# Patient Record
Sex: MALE | Race: WHITE | NOT HISPANIC OR LATINO | Employment: FULL TIME | URBAN - METROPOLITAN AREA
[De-identification: names, ages, dates, MRNs, and addresses within clinical notes are randomized per-mention and may not be internally consistent; named-entity substitution may affect disease eponyms.]

---

## 2019-02-27 ENCOUNTER — HOSPITAL ENCOUNTER (EMERGENCY)
Facility: HOSPITAL | Age: 39
Discharge: HOME | End: 2019-02-27
Attending: EMERGENCY MEDICINE | Admitting: EMERGENCY MEDICINE

## 2019-02-27 VITALS
SYSTOLIC BLOOD PRESSURE: 168 MMHG | HEART RATE: 100 BPM | DIASTOLIC BLOOD PRESSURE: 111 MMHG | HEIGHT: 74 IN | RESPIRATION RATE: 18 BRPM | TEMPERATURE: 98 F | BODY MASS INDEX: 26.95 KG/M2 | WEIGHT: 210 LBS | OXYGEN SATURATION: 97 %

## 2019-02-27 DIAGNOSIS — K04.7 DENTAL INFECTION: Primary | ICD-10-CM

## 2019-02-27 DIAGNOSIS — K08.89 PAIN, DENTAL: ICD-10-CM

## 2019-02-27 PROCEDURE — 99283 EMERGENCY DEPT VISIT LOW MDM: CPT

## 2019-02-27 RX ORDER — AMOXICILLIN 500 MG/1
500 CAPSULE ORAL 3 TIMES DAILY
Qty: 30 CAPSULE | Refills: 0 | Status: SHIPPED | OUTPATIENT
Start: 2019-02-27 | End: 2019-06-01

## 2019-02-27 RX ORDER — ETODOLAC 200 MG/1
200 CAPSULE ORAL EVERY 8 HOURS
Qty: 12 CAPSULE | Refills: 0 | Status: SHIPPED | OUTPATIENT
Start: 2019-02-27 | End: 2019-06-01

## 2019-02-27 RX ADMIN — LIDOCAINE HYDROCHLORIDE 5 ML: 20 SOLUTION ORAL; TOPICAL at 08:37

## 2019-06-01 ENCOUNTER — HOSPITAL ENCOUNTER (INPATIENT)
Facility: HOSPITAL | Age: 39
LOS: 2 days | Discharge: HOME | End: 2019-06-03
Attending: EMERGENCY MEDICINE | Admitting: INTERNAL MEDICINE

## 2019-06-01 ENCOUNTER — APPOINTMENT (OUTPATIENT)
Dept: CT IMAGING | Facility: HOSPITAL | Age: 39
End: 2019-06-01

## 2019-06-01 DIAGNOSIS — J03.90 TONSILLITIS: Primary | ICD-10-CM

## 2019-06-01 DIAGNOSIS — J36 PERITONSILLAR ABSCESS: ICD-10-CM

## 2019-06-01 PROBLEM — Z72.0 TOBACCO ABUSE: Status: ACTIVE | Noted: 2019-06-01

## 2019-06-01 PROBLEM — A41.9 SEPSIS, UNSPECIFIED ORGANISM (HCC): Status: ACTIVE | Noted: 2019-06-01

## 2019-06-01 PROBLEM — J02.9 ACUTE SORE THROAT: Status: ACTIVE | Noted: 2019-06-01

## 2019-06-01 LAB
ALBUMIN SERPL-MCNC: 4.2 G/DL (ref 3.5–5.2)
ALBUMIN/GLOB SERPL: 1.2 G/DL
ALP SERPL-CCNC: 87 U/L (ref 39–117)
ALT SERPL W P-5'-P-CCNC: 11 U/L (ref 1–41)
ANION GAP SERPL CALCULATED.3IONS-SCNC: 12 MMOL/L
ANION GAP SERPL CALCULATED.3IONS-SCNC: 13 MMOL/L
AST SERPL-CCNC: 17 U/L (ref 1–40)
BASOPHILS # BLD AUTO: 0.02 10*3/MM3 (ref 0–0.2)
BASOPHILS NFR BLD AUTO: 0.1 % (ref 0–1.5)
BILIRUB SERPL-MCNC: 0.9 MG/DL (ref 0.2–1.2)
BUN BLD-MCNC: 10 MG/DL (ref 6–20)
BUN BLD-MCNC: 12 MG/DL (ref 6–20)
BUN/CREAT SERPL: 11.8 (ref 7–25)
BUN/CREAT SERPL: 12.6 (ref 7–25)
CALCIUM SPEC-SCNC: 8.8 MG/DL (ref 8.6–10.5)
CALCIUM SPEC-SCNC: 9.3 MG/DL (ref 8.6–10.5)
CHLORIDE SERPL-SCNC: 103 MMOL/L (ref 98–107)
CHLORIDE SERPL-SCNC: 103 MMOL/L (ref 98–107)
CO2 SERPL-SCNC: 21 MMOL/L (ref 22–29)
CO2 SERPL-SCNC: 26 MMOL/L (ref 22–29)
CREAT BLD-MCNC: 0.85 MG/DL (ref 0.76–1.27)
CREAT BLD-MCNC: 0.95 MG/DL (ref 0.76–1.27)
D-LACTATE SERPL-SCNC: 0.8 MMOL/L (ref 0.5–2)
DEPRECATED RDW RBC AUTO: 45.4 FL (ref 37–54)
DEPRECATED RDW RBC AUTO: 46.8 FL (ref 37–54)
EOSINOPHIL # BLD AUTO: 0.16 10*3/MM3 (ref 0–0.4)
EOSINOPHIL NFR BLD AUTO: 1.2 % (ref 0.3–6.2)
ERYTHROCYTE [DISTWIDTH] IN BLOOD BY AUTOMATED COUNT: 13.7 % (ref 12.3–15.4)
ERYTHROCYTE [DISTWIDTH] IN BLOOD BY AUTOMATED COUNT: 13.8 % (ref 12.3–15.4)
GFR SERPL CREATININE-BSD FRML MDRD: 100 ML/MIN/1.73
GFR SERPL CREATININE-BSD FRML MDRD: 88 ML/MIN/1.73
GLOBULIN UR ELPH-MCNC: 3.4 GM/DL
GLUCOSE BLD-MCNC: 118 MG/DL (ref 65–99)
GLUCOSE BLD-MCNC: 161 MG/DL (ref 65–99)
HCT VFR BLD AUTO: 38.9 % (ref 37.5–51)
HCT VFR BLD AUTO: 40.2 % (ref 37.5–51)
HETEROPH AB SER QL LA: NEGATIVE
HGB BLD-MCNC: 12.7 G/DL (ref 13–17.7)
HGB BLD-MCNC: 12.7 G/DL (ref 13–17.7)
IMM GRANULOCYTES # BLD AUTO: 0.03 10*3/MM3 (ref 0–0.05)
IMM GRANULOCYTES NFR BLD AUTO: 0.2 % (ref 0–0.5)
LYMPHOCYTES # BLD AUTO: 1.97 10*3/MM3 (ref 0.7–3.1)
LYMPHOCYTES NFR BLD AUTO: 14.2 % (ref 19.6–45.3)
MCH RBC QN AUTO: 29.2 PG (ref 26.6–33)
MCH RBC QN AUTO: 29.4 PG (ref 26.6–33)
MCHC RBC AUTO-ENTMCNC: 31.6 G/DL (ref 31.5–35.7)
MCHC RBC AUTO-ENTMCNC: 32.6 G/DL (ref 31.5–35.7)
MCV RBC AUTO: 90 FL (ref 79–97)
MCV RBC AUTO: 92.4 FL (ref 79–97)
MONOCYTES # BLD AUTO: 1.25 10*3/MM3 (ref 0.1–0.9)
MONOCYTES NFR BLD AUTO: 9 % (ref 5–12)
NEUTROPHILS # BLD AUTO: 10.41 10*3/MM3 (ref 1.7–7)
NEUTROPHILS NFR BLD AUTO: 75.3 % (ref 42.7–76)
PLATELET # BLD AUTO: 254 10*3/MM3 (ref 140–450)
PLATELET # BLD AUTO: 256 10*3/MM3 (ref 140–450)
PMV BLD AUTO: 10.1 FL (ref 6–12)
PMV BLD AUTO: 10.6 FL (ref 6–12)
POTASSIUM BLD-SCNC: 4.1 MMOL/L (ref 3.5–5.2)
POTASSIUM BLD-SCNC: 4.3 MMOL/L (ref 3.5–5.2)
PROT SERPL-MCNC: 7.6 G/DL (ref 6–8.5)
RBC # BLD AUTO: 4.32 10*6/MM3 (ref 4.14–5.8)
RBC # BLD AUTO: 4.35 10*6/MM3 (ref 4.14–5.8)
S PYO AG THROAT QL: NEGATIVE
SODIUM BLD-SCNC: 137 MMOL/L (ref 136–145)
SODIUM BLD-SCNC: 141 MMOL/L (ref 136–145)
WBC NRBC COR # BLD: 13.84 10*3/MM3 (ref 3.4–10.8)
WBC NRBC COR # BLD: 13.95 10*3/MM3 (ref 3.4–10.8)

## 2019-06-01 PROCEDURE — 99285 EMERGENCY DEPT VISIT HI MDM: CPT

## 2019-06-01 PROCEDURE — 83605 ASSAY OF LACTIC ACID: CPT | Performed by: PHYSICIAN ASSISTANT

## 2019-06-01 PROCEDURE — 87081 CULTURE SCREEN ONLY: CPT | Performed by: FAMILY MEDICINE

## 2019-06-01 PROCEDURE — 80053 COMPREHEN METABOLIC PANEL: CPT | Performed by: EMERGENCY MEDICINE

## 2019-06-01 PROCEDURE — 25010000002 IOPAMIDOL 61 % SOLUTION: Performed by: EMERGENCY MEDICINE

## 2019-06-01 PROCEDURE — 70491 CT SOFT TISSUE NECK W/DYE: CPT

## 2019-06-01 PROCEDURE — 25010000002 PIPERACILLIN SOD-TAZOBACTAM PER 1 G: Performed by: INTERNAL MEDICINE

## 2019-06-01 PROCEDURE — 85025 COMPLETE CBC W/AUTO DIFF WBC: CPT | Performed by: EMERGENCY MEDICINE

## 2019-06-01 PROCEDURE — 25010000002 DEXAMETHASONE PER 1 MG: Performed by: FAMILY MEDICINE

## 2019-06-01 PROCEDURE — 87880 STREP A ASSAY W/OPTIC: CPT | Performed by: FAMILY MEDICINE

## 2019-06-01 PROCEDURE — 86308 HETEROPHILE ANTIBODY SCREEN: CPT | Performed by: OTOLARYNGOLOGY

## 2019-06-01 PROCEDURE — 25010000002 FENTANYL CITRATE (PF) 100 MCG/2ML SOLUTION: Performed by: EMERGENCY MEDICINE

## 2019-06-01 PROCEDURE — 87040 BLOOD CULTURE FOR BACTERIA: CPT | Performed by: PHYSICIAN ASSISTANT

## 2019-06-01 PROCEDURE — 99223 1ST HOSP IP/OBS HIGH 75: CPT | Performed by: FAMILY MEDICINE

## 2019-06-01 PROCEDURE — 99406 BEHAV CHNG SMOKING 3-10 MIN: CPT | Performed by: FAMILY MEDICINE

## 2019-06-01 PROCEDURE — 25010000002 PIPERACILLIN SOD-TAZOBACTAM PER 1 G: Performed by: EMERGENCY MEDICINE

## 2019-06-01 PROCEDURE — 85027 COMPLETE CBC AUTOMATED: CPT | Performed by: PHYSICIAN ASSISTANT

## 2019-06-01 PROCEDURE — 25010000002 DEXAMETHASONE PER 1 MG: Performed by: EMERGENCY MEDICINE

## 2019-06-01 RX ORDER — SODIUM CHLORIDE 0.9 % (FLUSH) 0.9 %
10 SYRINGE (ML) INJECTION AS NEEDED
Status: DISCONTINUED | OUTPATIENT
Start: 2019-06-01 | End: 2019-06-03 | Stop reason: HOSPADM

## 2019-06-01 RX ORDER — ACETAMINOPHEN 325 MG/1
650 TABLET ORAL EVERY 4 HOURS PRN
Status: DISCONTINUED | OUTPATIENT
Start: 2019-06-01 | End: 2019-06-03 | Stop reason: HOSPADM

## 2019-06-01 RX ORDER — DEXAMETHASONE IN 0.9 % SOD CHL 10 MG/50ML
10 INTRAVENOUS SOLUTION, PIGGYBACK (ML) INTRAVENOUS ONCE
Status: COMPLETED | OUTPATIENT
Start: 2019-06-01 | End: 2019-06-01

## 2019-06-01 RX ORDER — ONDANSETRON 2 MG/ML
4 INJECTION INTRAMUSCULAR; INTRAVENOUS EVERY 6 HOURS PRN
Status: DISCONTINUED | OUTPATIENT
Start: 2019-06-01 | End: 2019-06-03 | Stop reason: HOSPADM

## 2019-06-01 RX ORDER — SODIUM CHLORIDE 9 MG/ML
100 INJECTION, SOLUTION INTRAVENOUS CONTINUOUS
Status: DISCONTINUED | OUTPATIENT
Start: 2019-06-01 | End: 2019-06-01

## 2019-06-01 RX ORDER — SODIUM CHLORIDE 0.9 % (FLUSH) 0.9 %
3 SYRINGE (ML) INJECTION EVERY 12 HOURS SCHEDULED
Status: DISCONTINUED | OUTPATIENT
Start: 2019-06-01 | End: 2019-06-03 | Stop reason: HOSPADM

## 2019-06-01 RX ORDER — DEXAMETHASONE SODIUM PHOSPHATE 4 MG/ML
4 INJECTION, SOLUTION INTRA-ARTICULAR; INTRALESIONAL; INTRAMUSCULAR; INTRAVENOUS; SOFT TISSUE EVERY 8 HOURS
Status: DISCONTINUED | OUTPATIENT
Start: 2019-06-01 | End: 2019-06-03 | Stop reason: HOSPADM

## 2019-06-01 RX ORDER — SODIUM CHLORIDE 0.9 % (FLUSH) 0.9 %
3-10 SYRINGE (ML) INJECTION AS NEEDED
Status: DISCONTINUED | OUTPATIENT
Start: 2019-06-01 | End: 2019-06-03 | Stop reason: HOSPADM

## 2019-06-01 RX ORDER — CHOLECALCIFEROL (VITAMIN D3) 125 MCG
5 CAPSULE ORAL NIGHTLY PRN
Status: DISCONTINUED | OUTPATIENT
Start: 2019-06-01 | End: 2019-06-03 | Stop reason: HOSPADM

## 2019-06-01 RX ORDER — FENTANYL CITRATE 50 UG/ML
50 INJECTION, SOLUTION INTRAMUSCULAR; INTRAVENOUS ONCE
Status: COMPLETED | OUTPATIENT
Start: 2019-06-01 | End: 2019-06-01

## 2019-06-01 RX ORDER — TRAMADOL HYDROCHLORIDE 50 MG/1
75 TABLET ORAL EVERY 6 HOURS PRN
Status: DISCONTINUED | OUTPATIENT
Start: 2019-06-01 | End: 2019-06-03 | Stop reason: HOSPADM

## 2019-06-01 RX ADMIN — SODIUM CHLORIDE, PRESERVATIVE FREE 3 ML: 5 INJECTION INTRAVENOUS at 22:07

## 2019-06-01 RX ADMIN — TAZOBACTAM SODIUM AND PIPERACILLIN SODIUM 3.38 G: 375; 3 INJECTION, SOLUTION INTRAVENOUS at 22:06

## 2019-06-01 RX ADMIN — SODIUM CHLORIDE 1000 ML: 9 INJECTION, SOLUTION INTRAVENOUS at 01:51

## 2019-06-01 RX ADMIN — SODIUM CHLORIDE 100 ML/HR: 9 INJECTION, SOLUTION INTRAVENOUS at 05:13

## 2019-06-01 RX ADMIN — TRAMADOL HYDROCHLORIDE 75 MG: 50 TABLET, FILM COATED ORAL at 22:06

## 2019-06-01 RX ADMIN — DEXAMETHASONE SODIUM PHOSPHATE 4 MG: 4 INJECTION, SOLUTION INTRA-ARTICULAR; INTRALESIONAL; INTRAMUSCULAR; INTRAVENOUS; SOFT TISSUE at 10:14

## 2019-06-01 RX ADMIN — TAZOBACTAM SODIUM AND PIPERACILLIN SODIUM 3.38 G: 375; 3 INJECTION, SOLUTION INTRAVENOUS at 13:25

## 2019-06-01 RX ADMIN — TAZOBACTAM SODIUM AND PIPERACILLIN SODIUM 4.5 G: 500; 4 INJECTION, SOLUTION INTRAVENOUS at 02:48

## 2019-06-01 RX ADMIN — IOPAMIDOL 75 ML: 612 INJECTION, SOLUTION INTRAVENOUS at 01:55

## 2019-06-01 RX ADMIN — TRAMADOL HYDROCHLORIDE 75 MG: 50 TABLET, FILM COATED ORAL at 10:30

## 2019-06-01 RX ADMIN — FENTANYL CITRATE 50 MCG: 50 INJECTION INTRAMUSCULAR; INTRAVENOUS at 01:48

## 2019-06-01 RX ADMIN — DEXAMETHASONE SODIUM PHOSPHATE 10 MG: 10 INJECTION INTRAMUSCULAR; INTRAVENOUS at 02:10

## 2019-06-01 RX ADMIN — DEXAMETHASONE SODIUM PHOSPHATE 4 MG: 4 INJECTION, SOLUTION INTRA-ARTICULAR; INTRALESIONAL; INTRAMUSCULAR; INTRAVENOUS; SOFT TISSUE at 17:17

## 2019-06-01 NOTE — H&P
"    [facility] Medicine Services  HISTORY AND PHYSICAL    Patient Name: Sridhar Reeves  : 1980  MRN: [id number]  Primary Care Physician: Provider, No Known  Date of admission: 2019      Subjective   Subjective     Chief Complaint:  Sore throat    HPI:  Sridhar Reeves is a 38 y.o. male with no significant past medical history who presents with complaints of sore throat and dysphagia. Spontaneously started about 24 hours ago. He is concerned that he cannot swallow food or saliva. Mouth is dry and achy. Sore throat started as an \"itchy\" sensation then rapidly progressed to current state. There is erythema and edema with associated dental/periodontal pain, subjective fever, and nausea without vomiting. Patient volunteers recurrence of tonsillitis and \"strep throat\" since getting his wisdom tooth removed about 6 months ago. He has improved each time with PO ABX and tylenol. Has been referred to ENT as outpatient but unfortunately does not have insurance. Also notes sick contacts. Lives with 4 small children who apparently keep passing around viral throat and ear infections. No other complaints at this time. No cough, congestion, SOB, chest pain, abdominal pain, or N/V/D. Patient continues to smoke 1/2 PPD. Will admit for further evaluation and treatment.    Emergency Department Evaluation: febrile to 102. Vitals otherwise stable. WBC 13.84. CT neck demonstrates severe tonsillitis with developing left sided peritonsillar abscess and periodontal disease    Review of Systems   Constitutional: Positive for chills and fever.   HENT: Positive for sore throat and trouble swallowing. Negative for congestion.    Eyes: Negative for photophobia and visual disturbance.   Respiratory: Negative for cough and shortness of breath.    Cardiovascular: Negative for chest pain and leg swelling.   Gastrointestinal: Negative for abdominal pain, diarrhea, nausea and vomiting.   Endocrine: Negative for cold " intolerance and heat intolerance.   Genitourinary: Negative for dysuria and flank pain.   Musculoskeletal: Negative for back pain and gait problem.   Skin: Negative for pallor and rash.   Allergic/Immunologic: Negative for immunocompromised state.   Neurological: Negative for dizziness, weakness and headaches.   Hematological: Negative for adenopathy.   Psychiatric/Behavioral: Negative for agitation and confusion.          Otherwise complete ROS reviewed and is negative except as mentioned in the HPI.    Personal History     Past Medical History:   Diagnosis Date   • Strep throat        History reviewed. No pertinent surgical history.    Family History: family history is not on file. Otherwise pertinent FHx was reviewed and unremarkable.     Social History:  reports that he has been smoking cigarettes.  He has been smoking about 0.50 packs per day. He has never used smokeless tobacco. He reports that he drinks alcohol. He reports that he does not use drugs.  Social History     Social History Narrative   • Not on file       Medications:    Available home medication information reviewed.    (Not in a hospital admission)    No Known Allergies    Objective   Objective     Vital Signs:   Temp:  [100.2 °F (37.9 °C)-101.4 °F (38.6 °C)] 100.2 °F (37.9 °C)  Heart Rate:  [84-99] 84  Resp:  [16-18] 16  BP: (128-146)/(72-88) 132/72        Physical Exam   Constitutional: Awake, alert  Eyes: PERRLA, sclerae anicteric, no conjunctival injection  HENT: oropharynx dry but erythematous and edematous. Tonsils inflamed and enlarged  Mucous membranes dry  Neck: Supple, no thyromegaly, no lymphadenopathy, trachea midline  Respiratory: Clear to auscultation bilaterally, nonlabored respirations   Cardiovascular: RRR, no murmurs, rubs, or gallops, palpable pedal pulses bilaterally  Gastrointestinal: Positive bowel sounds, soft, nontender, nondistended  Musculoskeletal: No bilateral ankle edema, no clubbing or cyanosis to  extremities  Psychiatric: Appropriate affect, cooperative  Neurologic: Oriented x 3, strength symmetric in all extremities, Cranial Nerves grossly intact to confrontation, speech clear  Skin: No rashes      Results Reviewed:  I have personally reviewed current lab, radiology, and data and agree.    Results from last 7 days   Lab Units 06/01/19  0148   WBC 10*3/mm3 13.84*   HEMOGLOBIN g/dL 12.7*   HEMATOCRIT % 38.9   PLATELETS 10*3/mm3 254     Results from last 7 days   Lab Units 06/01/19  0148   SODIUM mmol/L 141   POTASSIUM mmol/L 4.3   CHLORIDE mmol/L 103   CO2 mmol/L 26.0   BUN mg/dL 12   CREATININE mg/dL 0.95   GLUCOSE mg/dL 118*   CALCIUM mg/dL 9.3   ALT (SGPT) U/L 11   AST (SGOT) U/L 17     Estimated Creatinine Clearance: 140.7 mL/min (by C-G formula based on SCr of 0.95 mg/dL).  Brief Urine Lab Results     None        Imaging Results (last 24 hours)     Procedure Component Value Units Date/Time    CT Soft Tissue Neck With Contrast [[id number]] Collected:  06/01/19 0224     Updated:  06/01/19 0226    Narrative:       CT Neck Soft Tissue W    HISTORY:   Left upper dental pain and facial swelling    TECHNIQUE:   CT of the neck, soft tissues with 75 cc Isovue-300 contrast. Coronal and sagittal reconstructions were obtained. Radiation dose reduction techniques included automated exposure control or exposure modulation based on body size. Count of known CT and  cardiac nuc med studies performed in previous 12 months: 0.     COMPARISON:   None available.    FINDINGS:   Left tonsillar and peritonsillar soft tissue swelling as well as soft tissue swelling and edema extending into the left parapharyngeal space. Foci of decreased attenuation without distinct peripheral enhancement may represent developing left  peritonsillar abscess. 2.1 x 1.5 cm low-attenuation collection left tonsillar pillars. A second less defined hypodensity noted in the left parapharyngeal soft tissues measuring 2.8 x 1.8 cm. Some mass effect on  the nasopharynx and oropharynx but no  definite airway obstruction. No definite involvement of the floor the mouth or base of tongue.    Moderate amount of bilateral cervical lymphadenopathy left greater than right. Prominent left submandibular lymph nodes. The parotid, submandibular and thyroid glands appear intact. Head and neck vasculature are unremarkable. Findings suggesting  periodontal disease but no definite periodontal abscess. No definite dental caries. Chronic maxillary and ethmoid sinus mucosal disease. Visualized brain parenchyma and skull base unremarkable. Epiglottis unremarkable.      Impression:       Left tonsillar, peritonsillar and parapharyngeal soft tissue swelling and edema consistent with active inflammatory process (tonsillitis) with some foci of decreased attenuation suggesting developing left peritonsillar abscess/phlegmon.    Evidence of periodontal disease but no definite periodontal abscess.    Extensive bilateral cervical lymphadenopathy left greater than right.          Signer Name: MIGUEL Tucker MD   Signed: 6/1/2019 2:24 AM   Workstation Name: [id number]                Assessment/Plan   Assessment / Plan     Active Hospital Problems    Diagnosis POA   • **Peritonsillar abscess [J36] Yes     Priority: High   • Tonsillitis [J03.90] Yes     Priority: High   • Tobacco abuse [Z72.0] Yes     Priority: Low         1. Sepsis due to severe tonsillitis with developing left peritonsillar abscess/phlegmon:  - history of dental/periodontal disease and recurrent tonsillitis  - administered zosyn in ED. Patient now febrile. Will continue zosyn but add blood cultures, rapid strep and lactic acid  - consult to Infectious Disease due to recurring infection and current developing possible abscess  - consult ENT for evaluation  - decadron 4mg IV q8h  - pain control  - NPO for now, as edema improves can trial clears      2. Tobacco abuse:  - nicotine patch as needed. Smokes 1/2 PPD. Tobacco  "Cessation Counselin minutes of tobacco cessation counseling provided, including but not limited to, risks of ongoing tobacco use, pertinence to current or future health status and availability/examples of cessation resources.  Patient expresses desire to quit.      DVT prophylaxis: mechanical    CODE STATUS:  Full code  Code Status and Medical Interventions:   Ordered at: 19 0454     Level Of Support Discussed With:    Patient     Code Status:    CPR     Medical Interventions (Level of Support Prior to Arrest):    Full       Admission Status:  I believe this patient meets INPATIENT status due to the need for care which can only be reasonably provided in an hospital setting.  In such, I feel patient’s risk for adverse outcomes and need for care warrant INPATIENT evaluation and predict the patient’s care encounter to likely last beyond 2 midnights.        Electronically signed by Suhail Rucker PA-C, 19, 4:55 AM.        Brief Attending Admission Attestation     I have seen and examined the patient, performing an independent face-to-face diagnostic evaluation with plan of care reviewed and developed with the advanced practice clinician (APC).      Brief Summary Statement/HPI:   Sridhar Reeves is a 38 y.o. male with no significant PMHx presented to [facility] ED with recurring \"strep throat\" of which he has had x3 since beginning of 2019 successfully tx'd with PO abx.  .  (+) sick contact with 4 young children a Sx's started 24 hrs ago, quickly escalated from scratchy throat to painful swallowing with globus sensation.  (+) fevers and chills.  Denies N/V, facial pain, HA.  In ED patient with fever, leukocytosis, CT neck with severe tonsillitis and possible early peritonsillar abscess.  Remainder of detailed HPI is as noted above and has been reviewed and/or edited by me for completeness.    Attending Physical Exam:  Constitutional: No acute distress, mildly toxic and diaphoretic states he's \"freezing\" "   HENT: NCAT, mucous membranes very dry, edematous beefy red tonsils with exudative film on pilars  Neck: L>R cervical lymphadenopathy  Respiratory: Clear to auscultation bilaterally, good effort, nonlabored respirations   Cardiovascular: RRR, tachy 100's  Musculoskeletal: No peripheral edema, normal muscle tone for age  Psychiatric: Appropriate affect, good insight and judgement, cooperative  Skin: No rashes, no mottling      Assessment/Plan :  See above section for further detailed assessment and plan developed with APC which I have reviewed and/or edited.      Electronically signed by Norma Monge MD, 06/01/19, 8:43 AM.

## 2019-06-01 NOTE — ED PROVIDER NOTES
Subjective   Mr. Sridhar Reeves is a 38 y.o. male who presents to the ED with complaints of oral swelling. He reports that for the past 3 days he has been having a worsening left sided sore throat and today he developed difficulty swallowing his secretions, which prompted presentation to the ED. He denies any nausea. He notes that he has had similar symptoms 3 times in the past and states that it was tonsillitis that was treated with antibiotics. He last took Tylenol around 0000. No other acute complaints at this time.         History provided by:  Patient  Sore Throat   Location:  Left  Severity:  Moderate  Duration:  3 days  Timing:  Constant  Progression:  Worsening  Chronicity:  New  Associated symptoms: trouble swallowing        Review of Systems   HENT: Positive for sore throat and trouble swallowing.    Gastrointestinal: Negative for nausea.   All other systems reviewed and are negative.      Past Medical History:   Diagnosis Date   • Strep throat        No Known Allergies    History reviewed. No pertinent surgical history.    History reviewed. No pertinent family history.    Social History     Socioeconomic History   • Marital status:      Spouse name: Not on file   • Number of children: Not on file   • Years of education: Not on file   • Highest education level: Not on file   Tobacco Use   • Smoking status: Current Every Day Smoker     Packs/day: 0.25     Types: Cigarettes   • Smokeless tobacco: Never Used   Substance and Sexual Activity   • Alcohol use: Yes     Alcohol/week: 1.2 oz     Types: 2 Cans of beer per week     Comment: socially   • Drug use: No   • Sexual activity: Defer         Objective   Physical Exam   Constitutional: He is oriented to person, place, and time. He appears well-developed and well-nourished. No distress.   HENT:   Head: Normocephalic and atraumatic.   Mouth/Throat: Posterior oropharyngeal erythema present.   He is able to open his mouth around FCI and the floor of his  mouth is soft. Patient has posterior oropharynx erythema and exudate. Trismus is present. He has a significantly enlarged left sided tonsil with mile uvular deviation towards the right.    Eyes: Conjunctivae are normal. No scleral icterus.   Neck: Normal range of motion. Neck supple.   Cardiovascular: Normal rate, regular rhythm and normal heart sounds.   No murmur heard.  Pulmonary/Chest: Effort normal and breath sounds normal. No respiratory distress.   Musculoskeletal: Normal range of motion.   Neurological: He is alert and oriented to person, place, and time.   Skin: Skin is warm and dry. He is not diaphoretic.   Psychiatric: He has a normal mood and affect. His behavior is normal.   Patient's voice is muffled but he is able to speak and answer questions.    Nursing note and vitals reviewed.      Procedures         ED Course       Recent Results (from the past 24 hour(s))   CBC Auto Differential    Collection Time: 06/01/19  1:48 AM   Result Value Ref Range    WBC 13.84 (H) 3.40 - 10.80 10*3/mm3    RBC 4.32 4.14 - 5.80 10*6/mm3    Hemoglobin 12.7 (L) 13.0 - 17.7 g/dL    Hematocrit 38.9 37.5 - 51.0 %    MCV 90.0 79.0 - 97.0 fL    MCH 29.4 26.6 - 33.0 pg    MCHC 32.6 31.5 - 35.7 g/dL    RDW 13.7 12.3 - 15.4 %    RDW-SD 45.4 37.0 - 54.0 fl    MPV 10.1 6.0 - 12.0 fL    Platelets 254 140 - 450 10*3/mm3    Neutrophil % 75.3 42.7 - 76.0 %    Lymphocyte % 14.2 (L) 19.6 - 45.3 %    Monocyte % 9.0 5.0 - 12.0 %    Eosinophil % 1.2 0.3 - 6.2 %    Basophil % 0.1 0.0 - 1.5 %    Immature Grans % 0.2 0.0 - 0.5 %    Neutrophils, Absolute 10.41 (H) 1.70 - 7.00 10*3/mm3    Lymphocytes, Absolute 1.97 0.70 - 3.10 10*3/mm3    Monocytes, Absolute 1.25 (H) 0.10 - 0.90 10*3/mm3    Eosinophils, Absolute 0.16 0.00 - 0.40 10*3/mm3    Basophils, Absolute 0.02 0.00 - 0.20 10*3/mm3    Immature Grans, Absolute 0.03 0.00 - 0.05 10*3/mm3   Comprehensive Metabolic Panel    Collection Time: 06/01/19  1:48 AM   Result Value Ref Range    Glucose 118  (H) 65 - 99 mg/dL    BUN 12 6 - 20 mg/dL    Creatinine 0.95 0.76 - 1.27 mg/dL    Sodium 141 136 - 145 mmol/L    Potassium 4.3 3.5 - 5.2 mmol/L    Chloride 103 98 - 107 mmol/L    CO2 26.0 22.0 - 29.0 mmol/L    Calcium 9.3 8.6 - 10.5 mg/dL    Total Protein 7.6 6.0 - 8.5 g/dL    Albumin 4.20 3.50 - 5.20 g/dL    ALT (SGPT) 11 1 - 41 U/L    AST (SGOT) 17 1 - 40 U/L    Alkaline Phosphatase 87 39 - 117 U/L    Total Bilirubin 0.9 0.2 - 1.2 mg/dL    eGFR Non African Amer 88 >60 mL/min/1.73    Globulin 3.4 gm/dL    A/G Ratio 1.2 g/dL    BUN/Creatinine Ratio 12.6 7.0 - 25.0    Anion Gap 12.0 mmol/L   Basic Metabolic Panel    Collection Time: 06/01/19  9:47 AM   Result Value Ref Range    Glucose 161 (H) 65 - 99 mg/dL    BUN 10 6 - 20 mg/dL    Creatinine 0.85 0.76 - 1.27 mg/dL    Sodium 137 136 - 145 mmol/L    Potassium 4.1 3.5 - 5.2 mmol/L    Chloride 103 98 - 107 mmol/L    CO2 21.0 (L) 22.0 - 29.0 mmol/L    Calcium 8.8 8.6 - 10.5 mg/dL    eGFR Non African Amer 100 >60 mL/min/1.73    BUN/Creatinine Ratio 11.8 7.0 - 25.0    Anion Gap 13.0 mmol/L   CBC (No Diff)    Collection Time: 06/01/19  9:47 AM   Result Value Ref Range    WBC 13.95 (H) 3.40 - 10.80 10*3/mm3    RBC 4.35 4.14 - 5.80 10*6/mm3    Hemoglobin 12.7 (L) 13.0 - 17.7 g/dL    Hematocrit 40.2 37.5 - 51.0 %    MCV 92.4 79.0 - 97.0 fL    MCH 29.2 26.6 - 33.0 pg    MCHC 31.6 31.5 - 35.7 g/dL    RDW 13.8 12.3 - 15.4 %    RDW-SD 46.8 37.0 - 54.0 fl    MPV 10.6 6.0 - 12.0 fL    Platelets 256 140 - 450 10*3/mm3   Mononucleosis Screen    Collection Time: 06/01/19  9:47 AM   Result Value Ref Range    Monospot Negative Negative   Rapid Strep A Screen - Swab, Throat    Collection Time: 06/01/19 10:18 AM   Result Value Ref Range    Strep A Ag Negative Negative   Lactic Acid, Plasma    Collection Time: 06/01/19 10:44 AM   Result Value Ref Range    Lactate 0.8 0.5 - 2.0 mmol/L     Note: In addition to lab results from this visit, the labs listed above may include labs taken at  another facility or during a different encounter within the last 24 hours. Please correlate lab times with ED admission and discharge times for further clarification of the services performed during this visit.    CT Soft Tissue Neck With Contrast   Final Result   Left tonsillar, peritonsillar and parapharyngeal soft tissue swelling and edema consistent with active inflammatory process (tonsillitis) with some foci of decreased attenuation suggesting developing left peritonsillar abscess/phlegmon.      Evidence of periodontal disease but no definite periodontal abscess.      Extensive bilateral cervical lymphadenopathy left greater than right.               Signer Name: MIGUEL Tucker MD    Signed: 6/1/2019 2:24 AM    Workstation Name: [id number]            Vitals:    06/01/19 0738 06/01/19 0852 06/01/19 1150 06/01/19 1545   BP: 114/84 125/77 121/77 118/78   BP Location:  Left arm Left arm Left arm   Patient Position:  Lying Lying Lying   Pulse: 64  58 71   Resp: 24 16 16 16   Temp: 97.9 °F (36.6 °C) 97.8 °F (36.6 °C) 98 °F (36.7 °C) 97.8 °F (36.6 °C)   TempSrc: Oral Oral Oral Oral   SpO2: 96% 93% 94% 92%   Weight:       Height:         Medications   sodium chloride 0.9 % flush 10 mL (not administered)   sodium chloride 0.9 % flush 3 mL (3 mL Intravenous Not Given 6/1/19 0957)   sodium chloride 0.9 % flush 3-10 mL (not administered)   acetaminophen (TYLENOL) tablet 650 mg (not administered)   melatonin tablet 5 mg (not administered)   ondansetron (ZOFRAN) injection 4 mg (not administered)   dexamethasone (DECADRON) injection 4 mg (4 mg Intravenous Given 6/1/19 1717)   traMADol (ULTRAM) tablet 75 mg (75 mg Oral Given 6/1/19 1030)   piperacillin-tazobactam (ZOSYN) 3.375 g in iso-osmotic dextrose 50 ml (premix) (3.375 g Intravenous New Bag 6/1/19 1325)   Dexamethasone Sod Phos-NaCl 10-0.9 MG/50ML-% IVPB solution 10 mg (10 mg Intravenous Given 6/1/19 0210)   piperacillin-tazobactam (ZOSYN) 4.5 g in iso-osmotic  dextrose 100 mL IVPB (premix) (0 g Intravenous Stopped 6/1/19 0318)   fentaNYL citrate (PF) (SUBLIMAZE) injection 50 mcg (50 mcg Intravenous Given 6/1/19 0148)   sodium chloride 0.9 % bolus 1,000 mL (0 mL Intravenous Stopped 6/1/19 0221)   iopamidol (ISOVUE-300) 61 % injection 100 mL (75 mL Intravenous Given 6/1/19 0155)     ECG/EMG Results (last 24 hours)     ** No results found for the last 24 hours. **        No orders to display                     MDM    Final diagnoses:   Tonsillitis   Peritonsillar abscess       Documentation assistance provided by kit Hyman.  Information recorded by the scribe was done at my direction and has been verified and validated by me.     Jeannette Hyman  06/01/19 6188       Yadiel Fink DO  06/01/19 2629

## 2019-06-01 NOTE — CONSULTS
INFECTIOUS DISEASE CONSULT/INITIAL HOSPITAL VISIT    Sridhar Reeves  1980  [id number]    Date of Consult: 6/1/2019    Admission Date: 6/1/2019      Requesting Provider: Noram Monge MD  Evaluating Physician: Josue Ramirez MD    Reason for Consultation: Peritonsillar abscess    History of present illness:    Patient is a 38 y.o. male with h/o Strep throat since he was a child who we are to see for a evolving left peritonsillar abscess/phlegmon.  The patient presented with sore throat and odynophagia that started about a day ago.  He is having difficulty swallowing.  The symptoms started as an itchy sensation at the back of his throat.  He does have recurrent tonsillitis and strep throat since was a child, but worse (3 times) since he had his left lower wisdom tooth extracted about a year ago.  He usually improve on oral antibiotic with last being Keflex.  He has been referred to ENT but did not have insurance to make the visit until recently.  He works as a  and smokes about 1/4 to 1/2 ppd.  He has 4 children at home and they have been passing around viral throat and ear infections.  His youngest child is 19 months old with similar symptoms and has been sleeping with he and his wife. He denies fever, chills, shortness of breath, cough, nausea, vomiting, diarrhea, or dysuria.  Work up shows Tmax 101.4, lactic acid 0.8,  and WBC 13,800 with 75% neutrophils.  Blood cultures are pending. Strep A screen is negative with culture pending and mono screen is ordered.   A CT scan of neck showed left tonsillar, peritonsillar, and parapharyngeal soft tissue swelling and edema c/w tonsillitis with a focus of developing left peritonsillar abscess/phlegmon about 2.1 x 1.5 cm in size.  He was started on Zosyn.  ID was asked to evaluate and manage his antibiotic therapy.     He feels much better since being admitted and started on IV antibiotics last night.     Past Medical History:   Diagnosis Date   • Strep  throat      Surgical Hx  Left lower wisdom tooth extraction about a year ago    History reviewed. No pertinent family history.    Social History     Socioeconomic History   • Marital status:      Spouse name: Not on file   • Number of children: Not on file   • Years of education: Not on file   • Highest education level: Not on file   Tobacco Use   • Smoking status: Current Every Day Smoker     Packs/day: 0.25     Types: Cigarettes   • Smokeless tobacco: Never Used   Substance and Sexual Activity   • Alcohol use: Yes     Alcohol/week: 1.2 oz     Types: 2 Cans of beer per week     Comment: socially   • Drug use: No   • Sexual activity: Defer       No Known Allergies      Medication:    Current Facility-Administered Medications:   •  acetaminophen (TYLENOL) tablet 650 mg, 650 mg, Oral, Q4H PRN, Suhail Rucker PA-C  •  dexamethasone (DECADRON) injection 4 mg, 4 mg, Intravenous, Q8H, Norma Monge MD, 4 mg at 06/01/19 1014  •  melatonin tablet 5 mg, 5 mg, Oral, Nightly PRN, Suhail Rucker PA-C  •  ondansetron (ZOFRAN) injection 4 mg, 4 mg, Intravenous, Q6H PRN, Suhail Rucker PA-C  •  piperacillin-tazobactam (ZOSYN) 3.375 g in iso-osmotic dextrose 50 ml (premix), 3.375 g, Intravenous, Q8H, Suhail Rucker PA-C  •  [COMPLETED] Insert peripheral IV, , , Once **AND** sodium chloride 0.9 % flush 10 mL, 10 mL, Intravenous, PRN, Yadiel Fink, DO  •  sodium chloride 0.9 % flush 3 mL, 3 mL, Intravenous, Q12H, Suhail Rucker PA-C  •  sodium chloride 0.9 % flush 3-10 mL, 3-10 mL, Intravenous, PRN, Suhail Rucker PA-C  •  sodium chloride 0.9 % infusion, 100 mL/hr, Intravenous, Continuous, Suhail Rucker PA-C, Last Rate: 100 mL/hr at 06/01/19 0956, 100 mL/hr at 06/01/19 0956  •  traMADol (ULTRAM) tablet 75 mg, 75 mg, Oral, Q6H PRN, Norma Monge MD, 75 mg at 06/01/19 1030    Antibiotics:  Anti-Infectives (From admission, onward)    Ordered     Dose/Rate Route Frequency Start Stop    06/01/19 6687   piperacillin-tazobactam (ZOSYN) 3.375 g in iso-osmotic dextrose 50 ml (premix)     Ordering Provider:  Suhail Rucker PA-C    3.375 g  over 4 Hours Intravenous Every 8 Hours 19 1200 19 1159    19 0117  piperacillin-tazobactam (ZOSYN) 4.5 g in iso-osmotic dextrose 100 mL IVPB (premix)     Ordering Provider:  Yadiel Fink DO    4.5 g  over 30 Minutes Intravenous Once 19 0119 19 0318            Review of Systems:  Constitutional--+ Fever. Poor appetite.   HEENT-- No new vision or hearing complaints.  No epistaxis or oral sores.  + sore throat and odynophagia  No dysphagia. No headache, photophobia or neck stiffness.  CV-- No chest pain, palpitation or syncope  Resp-- No SOB/cough/Hemoptysis  GI- No nausea, vomiting, or diarrhea.  No hematochezia, melena, or hematemesis. Denies jaundice or chronic liver disease.  -- No dysuria, hematuria, or flank pain.  Denies hesitancy, urgency or flank pain.  Lymph- no swollen lymph nodes in neck/axilla or groin.   Heme- No active bruising or bleeding; no Hx of DVT or PE.  MS-- no swelling or pain in the bones or joints of arms/legs.  No new back pain.  Neuro-- No acute focal weakness or numbness in the arms or legs.  No seizures.  Skin--No rashes or lesions      Physical Exam:   Vital Signs  Temp (24hrs), Av.3 °F (37.4 °C), Min:97.8 °F (36.6 °C), Max:101.4 °F (38.6 °C)    Temp  Min: 97.8 °F (36.6 °C)  Max: 101.4 °F (38.6 °C)  BP  Min: 114/84  Max: 146/78  Pulse  Min: 64  Max: 99  Resp  Min: 16  Max: 24  SpO2  Min: 93 %  Max: 98 %    GENERAL: Awake and alert, in no acute distress.   HEENT: Normocephalic, atraumatic.  PERRL. EOMI. No conjunctival injection. No icterus. Oropharynx with significantly enlarged left tonsil with surrounding erythema. No purulent drainage appreciated. No evidence of peridontal disease.  Difficulty open mouth wide.   NECK: Supple without nuchal rigidity   LYMPH: Submandibular/cervical lymphadenopathy with significant  pain on palation   HEART: RRR; No murmur, rubs, gallops.   LUNGS: Clear to auscultation bilaterally without wheezing, rales, rhonchi. Normal respiratory effort. Nonlabored. No dullness.  ABDOMEN: Soft, nontender, nondistended. Positive bowel sounds. No rebound or guarding. NO mass or HSM.  EXT:  No cyanosis, clubbing or edema.   :   Without Pino catheter.  MSK: FROM without joint effusions noted arms/legs.    SKIN: Warm and dry without cutaneous eruptions on Inspection/palpation.    NEURO: Oriented to PPT. No focal deficits on motor/sensory exam at arms/legs.  PSYCHIATRIC: Normal insight and judgement. Cooperative with PE    Laboratory Data    Results from last 7 days   Lab Units 06/01/19  0947 06/01/19  0148   WBC 10*3/mm3 13.95* 13.84*   HEMOGLOBIN g/dL 12.7* 12.7*   HEMATOCRIT % 40.2 38.9   PLATELETS 10*3/mm3 256 254     Results from last 7 days   Lab Units 06/01/19  0148   SODIUM mmol/L 141   POTASSIUM mmol/L 4.3   CHLORIDE mmol/L 103   CO2 mmol/L 26.0   BUN mg/dL 12   CREATININE mg/dL 0.95   GLUCOSE mg/dL 118*   CALCIUM mg/dL 9.3     Results from last 7 days   Lab Units 06/01/19  0148   ALK PHOS U/L 87   BILIRUBIN mg/dL 0.9   ALT (SGPT) U/L 11   AST (SGOT) U/L 17                         Estimated Creatinine Clearance: 140.7 mL/min (by C-G formula based on SCr of 0.95 mg/dL).      Microbiology:      Blood cx pending      Strep A screen negative. culture pending  Mono screen pending         Radiology:  Imaging Results (last 72 hours)     Procedure Component Value Units Date/Time    CT Soft Tissue Neck With Contrast [[id number]] Collected:  06/01/19 0224     Updated:  06/01/19 0226    Narrative:       CT Neck Soft Tissue W    HISTORY:   Left upper dental pain and facial swelling    TECHNIQUE:   CT of the neck, soft tissues with 75 cc Isovue-300 contrast. Coronal and sagittal reconstructions were obtained. Radiation dose reduction techniques included automated exposure control or exposure modulation based on body  size. Count of known CT and  cardiac nuc med studies performed in previous 12 months: 0.     COMPARISON:   None available.    FINDINGS:   Left tonsillar and peritonsillar soft tissue swelling as well as soft tissue swelling and edema extending into the left parapharyngeal space. Foci of decreased attenuation without distinct peripheral enhancement may represent developing left  peritonsillar abscess. 2.1 x 1.5 cm low-attenuation collection left tonsillar pillars. A second less defined hypodensity noted in the left parapharyngeal soft tissues measuring 2.8 x 1.8 cm. Some mass effect on the nasopharynx and oropharynx but no  definite airway obstruction. No definite involvement of the floor the mouth or base of tongue.    Moderate amount of bilateral cervical lymphadenopathy left greater than right. Prominent left submandibular lymph nodes. The parotid, submandibular and thyroid glands appear intact. Head and neck vasculature are unremarkable. Findings suggesting  periodontal disease but no definite periodontal abscess. No definite dental caries. Chronic maxillary and ethmoid sinus mucosal disease. Visualized brain parenchyma and skull base unremarkable. Epiglottis unremarkable.      Impression:       Left tonsillar, peritonsillar and parapharyngeal soft tissue swelling and edema consistent with active inflammatory process (tonsillitis) with some foci of decreased attenuation suggesting developing left peritonsillar abscess/phlegmon.    Evidence of periodontal disease but no definite periodontal abscess.    Extensive bilateral cervical lymphadenopathy left greater than right.          Signer Name: MIGUEL Tucker MD   Signed: 6/1/2019 2:24 AM   Workstation Name: [email or web address]           I personally reviewed the above CT scan images      Impression:   - Acute evolving left peritonsillar abscess/phlegmon secondary to tonsillitis.  Usual organisms are viridans strep, anaerobes, strep A  - Acute left tonsillitis  - Fever,  sepsis, secondary to above  - neutrophilic leukocytosis   - H/o recurrent Strep throat and tonsillitis    PLAN/RECOMMENDATIONS:   Thank you for asking us to see Sridhar Reeves, I recommend the following:    - Monitor blood cultures.  Awaiting strep throat culture and mono screen.    - Currently on zosyn. Attempted to de-escalate to Unasyn but it is on back-order. Will continue Zosyn which provides more than adequate coverage    - ENT evaluation pending.     Josue Ramirez MD saw and examined patient, verified hx and PE, read all radiographic studies, reviewed labs and micro data, and formulated dx, plan for treatment and all medical decision making.      OSIRIS MacC for MD Balbir Liang PA  6/1/2019  11:02 AM

## 2019-06-01 NOTE — PROGRESS NOTES
38-year-old male admitted this morning by the night hospitalist physician.  He had voice change yesterday with difficulty breathing and swallowing.  CT scan showed concern for tonsillitis with possible abscess.  ENT is recommending continued IV antibiotics as is infectious disease.  Most likely he will continue this over the weekend and ENT recommends probable outpatient tonsillectomy in the future once infection has improved.  Plan for trial of diet this morning.  Monitor carefully for any changes in breathing.    Constitutional:Awake, alert  HENT: Enlarged tonsil, mucous membranes moist, lymphadenopathy  Respiratory: Voice change with upper airway noises, otherwise clear to auscultation bilaterally, respiratory effort normal, nonlabored breathing   Cardiovascular: RRR, S1, S2, normal radial pulses  Gastrointestinal: Positive bowel sounds, soft, nontender, nondistended  Musculoskeletal: Normal musculature for age, no lower extremity edema, BMI 26  Psychiatric: Appropriate affect, cooperative, conversational  Neurologic: No slurred speech or facial droop, follows commands  Skin: No rashes or jaundice, warm

## 2019-06-01 NOTE — PLAN OF CARE
Problem: Patient Care Overview  Goal: Plan of Care Review  Outcome: Ongoing (interventions implemented as appropriate)   06/01/19 7806   Coping/Psychosocial   Plan of Care Reviewed With patient   Plan of Care Review   Progress improving   OTHER   Outcome Summary Patient reports feeling better after IV fluids in ER. Medicated once for throat pain. Tolerating PO intake without complaints of throat pain. IVF infusing as ordered. Call light in reach to make needs known.       Problem: Infection, Risk/Actual (Adult)  Goal: Identify Related Risk Factors and Signs and Symptoms  Outcome: Ongoing (interventions implemented as appropriate)

## 2019-06-01 NOTE — CONSULTS
"ENT H&P    Sridhar Reeves  [id number]  1980  Date of Consult 6/1/2019       Referring Provider: Dr. Hyman  Chief Complaint: Difficulty swallowing and breathing          .     History of present illness: I was asked to see Mr. Reeves who is a 38-year-old young man that has had issues with chronic tonsillitis, recurrent peritonsillar abscesses and recent onset of tonsillar hypertrophy.  He woke up yesterday morning with significant swelling of both tonsils.  He notes it began become more difficult to breathe and swallow over the ensuing 12 hours.  He was admitted to the hospital through the emergency room for observation and treatment.  He notes that he has had multiple episodes of recurrent tonsillitis and he has had to have his tonsil abscesses drained at least on 2 prior occasions.  He had plan on having his tonsils removed but it has not happened yet.  He is swallowing much more easily, his voice is more normal to him and he denies any fever chills or other systemic symptoms at this time    Review of Systems  ENT ROS: GI: Dysphasia, odynophagia,  Respiratory: Dyspnea on exertion has improved over the last 12 hours, no chest pain  Pertinent items are noted in HPI  History  Past Medical History:   Diagnosis Date   • Strep throat    , History reviewed. No pertinent surgical history., History reviewed. No pertinent family history.,   Social History     Tobacco Use   • Smoking status: Current Every Day Smoker     Packs/day: 0.25     Types: Cigarettes   • Smokeless tobacco: Never Used   Substance Use Topics   • Alcohol use: Yes     Alcohol/week: 1.2 oz     Types: 2 Cans of beer per week     Comment: socially   • Drug use: No   ,   No medications prior to admission.    and Allergies:  Patient has no known allergies.    Objective     Vital Signs   /77 (BP Location: Left arm, Patient Position: Lying)   Pulse 58   Temp 98 °F (36.7 °C) (Oral)   Resp 16   Ht 188 cm (74\")   Wt 95.3 kg (210 lb)   SpO2 94%   " BMI 26.96 kg/m²     Physical Exam:     General Appearance:    Alert, cooperative, in no acute distress, minimal hot potato quality to his voice   Head:    Normocephalic, without obvious abnormality, atraumatic   Ears:    Ears appear intact with no abnormalities noted   Throat:  His tonsils are significantly enlarged and meeting in the midline superiorly.  Inferiorly, there is a patent airway, He does have some left palatal edema but no palpable fluctuance,  oral mucosa moist   Neck:  Minimal adenopathy, supple, trachea midline, no thyromegaly,            Skin:   No bleeding, bruising or rash   Neurologic:   Cranial nerves 2 - 12 grossly intact, sensation intact             Results Review:   I reviewed the patient's new clinical results.  Results from last 7 days   Lab Units 06/01/19  0947   WBC 10*3/mm3 13.95*   HEMOGLOBIN g/dL 12.7*   HEMATOCRIT % 40.2   PLATELETS 10*3/mm3 256     Results from last 7 days   Lab Units 06/01/19  0947 06/01/19  0148   SODIUM mmol/L 137 141   POTASSIUM mmol/L 4.1 4.3   CHLORIDE mmol/L 103 103   CO2 mmol/L 21.0* 26.0   BUN mg/dL 10 12   CREATININE mg/dL 0.85 0.95   CALCIUM mg/dL 8.8 9.3   BILIRUBIN mg/dL  --  0.9   ALK PHOS U/L  --  87   ALT (SGPT) U/L  --  11   AST (SGOT) U/L  --  17   GLUCOSE mg/dL 161* 118*       Imaging:  Imaging Results (last 72 hours)     Procedure Component Value Units Date/Time    CT Soft Tissue Neck With Contrast [[id number]] Collected:  06/01/19 0224     Updated:  06/01/19 0226    Narrative:       CT Neck Soft Tissue W    HISTORY:   Left upper dental pain and facial swelling    TECHNIQUE:   CT of the neck, soft tissues with 75 cc Isovue-300 contrast. Coronal and sagittal reconstructions were obtained. Radiation dose reduction techniques included automated exposure control or exposure modulation based on body size. Count of known CT and  cardiac nuc med studies performed in previous 12 months: 0.     COMPARISON:   None available.    FINDINGS:   Left tonsillar  and peritonsillar soft tissue swelling as well as soft tissue swelling and edema extending into the left parapharyngeal space. Foci of decreased attenuation without distinct peripheral enhancement may represent developing left  peritonsillar abscess. 2.1 x 1.5 cm low-attenuation collection left tonsillar pillars. A second less defined hypodensity noted in the left parapharyngeal soft tissues measuring 2.8 x 1.8 cm. Some mass effect on the nasopharynx and oropharynx but no  definite airway obstruction. No definite involvement of the floor the mouth or base of tongue.    Moderate amount of bilateral cervical lymphadenopathy left greater than right. Prominent left submandibular lymph nodes. The parotid, submandibular and thyroid glands appear intact. Head and neck vasculature are unremarkable. Findings suggesting  periodontal disease but no definite periodontal abscess. No definite dental caries. Chronic maxillary and ethmoid sinus mucosal disease. Visualized brain parenchyma and skull base unremarkable. Epiglottis unremarkable.      Impression:       Left tonsillar, peritonsillar and parapharyngeal soft tissue swelling and edema consistent with active inflammatory process (tonsillitis) with some foci of decreased attenuation suggesting developing left peritonsillar abscess/phlegmon.    Evidence of periodontal disease but no definite periodontal abscess.    Extensive bilateral cervical lymphadenopathy left greater than right.          Signer Name: MIGUEL Tucker MD   Signed: 6/1/2019 2:24 AM   Workstation Name: [email or web address]                   Assessment:  He is acute bilateral exudative tonsillitis-the left is definitely larger than the right.  He is responded well to his current antibiotic regimen and notes that the swelling has gone down since last evening.  Based on his CT and his clinical findings, I do feel that this is likely an infectious and inflammatory process more of a phlegmon than an abscess.  However,  he has had at least 2 prior peritonsillar abscesses in the last several years.  I would like to have a Monospot done to rule out mononucleosis.  I do feel that the differential on the white count may be beneficial also      Peritonsillar abscess-by prior history- good response to current medical management    Tobacco abuse    Tonsillitis-recurrent    Sepsis, unspecified organism (CMS/[facility])      Plan:  As he has had good response to his current IV therapy including antibiotics and steroids, I would continue with this for the next several days and then switch to oral antibiotics.  We had a long discussion regarding long-term management of this condition.  I would recommend tonsillectomy.  At this time however, I would recommend medical therapy until the edema and inflammation resolves.  I will make arrangements to see him as an outpatient to discuss further plans.    I discussed the patients findings and my recommendations with him    Jackson Matias MD  06/01/19  12:21 PM    Time:

## 2019-06-02 LAB
BASOPHILS # BLD AUTO: 0.01 10*3/MM3 (ref 0–0.2)
BASOPHILS NFR BLD AUTO: 0.1 % (ref 0–1.5)
DEPRECATED RDW RBC AUTO: 45.9 FL (ref 37–54)
EOSINOPHIL # BLD AUTO: 0 10*3/MM3 (ref 0–0.4)
EOSINOPHIL NFR BLD AUTO: 0 % (ref 0.3–6.2)
ERYTHROCYTE [DISTWIDTH] IN BLOOD BY AUTOMATED COUNT: 13.7 % (ref 12.3–15.4)
HCT VFR BLD AUTO: 39.5 % (ref 37.5–51)
HGB BLD-MCNC: 12.5 G/DL (ref 13–17.7)
IMM GRANULOCYTES # BLD AUTO: 0.06 10*3/MM3 (ref 0–0.05)
IMM GRANULOCYTES NFR BLD AUTO: 0.4 % (ref 0–0.5)
LYMPHOCYTES # BLD AUTO: 1.69 10*3/MM3 (ref 0.7–3.1)
LYMPHOCYTES NFR BLD AUTO: 10 % (ref 19.6–45.3)
MCH RBC QN AUTO: 28.9 PG (ref 26.6–33)
MCHC RBC AUTO-ENTMCNC: 31.6 G/DL (ref 31.5–35.7)
MCV RBC AUTO: 91.4 FL (ref 79–97)
MONOCYTES # BLD AUTO: 0.69 10*3/MM3 (ref 0.1–0.9)
MONOCYTES NFR BLD AUTO: 4.1 % (ref 5–12)
NEUTROPHILS # BLD AUTO: 14.53 10*3/MM3 (ref 1.7–7)
NEUTROPHILS NFR BLD AUTO: 85.8 % (ref 42.7–76)
PLATELET # BLD AUTO: 275 10*3/MM3 (ref 140–450)
PMV BLD AUTO: 10.7 FL (ref 6–12)
RBC # BLD AUTO: 4.32 10*6/MM3 (ref 4.14–5.8)
WBC NRBC COR # BLD: 16.92 10*3/MM3 (ref 3.4–10.8)

## 2019-06-02 PROCEDURE — 25010000002 PIPERACILLIN SOD-TAZOBACTAM PER 1 G: Performed by: INTERNAL MEDICINE

## 2019-06-02 PROCEDURE — 85025 COMPLETE CBC W/AUTO DIFF WBC: CPT | Performed by: INTERNAL MEDICINE

## 2019-06-02 PROCEDURE — 25010000002 DEXAMETHASONE PER 1 MG: Performed by: FAMILY MEDICINE

## 2019-06-02 PROCEDURE — 99232 SBSQ HOSP IP/OBS MODERATE 35: CPT | Performed by: INTERNAL MEDICINE

## 2019-06-02 RX ORDER — CALCIUM CARBONATE 200(500)MG
1 TABLET,CHEWABLE ORAL 3 TIMES DAILY PRN
Status: DISCONTINUED | OUTPATIENT
Start: 2019-06-02 | End: 2019-06-03 | Stop reason: HOSPADM

## 2019-06-02 RX ADMIN — DEXAMETHASONE SODIUM PHOSPHATE 4 MG: 4 INJECTION, SOLUTION INTRA-ARTICULAR; INTRALESIONAL; INTRAMUSCULAR; INTRAVENOUS; SOFT TISSUE at 17:31

## 2019-06-02 RX ADMIN — SODIUM CHLORIDE, PRESERVATIVE FREE 3 ML: 5 INJECTION INTRAVENOUS at 09:22

## 2019-06-02 RX ADMIN — TAZOBACTAM SODIUM AND PIPERACILLIN SODIUM 3.38 G: 375; 3 INJECTION, SOLUTION INTRAVENOUS at 13:18

## 2019-06-02 RX ADMIN — DEXAMETHASONE SODIUM PHOSPHATE 4 MG: 4 INJECTION, SOLUTION INTRA-ARTICULAR; INTRALESIONAL; INTRAMUSCULAR; INTRAVENOUS; SOFT TISSUE at 04:07

## 2019-06-02 RX ADMIN — TRAMADOL HYDROCHLORIDE 75 MG: 50 TABLET, FILM COATED ORAL at 09:28

## 2019-06-02 RX ADMIN — DEXAMETHASONE SODIUM PHOSPHATE 4 MG: 4 INJECTION, SOLUTION INTRA-ARTICULAR; INTRALESIONAL; INTRAMUSCULAR; INTRAVENOUS; SOFT TISSUE at 09:22

## 2019-06-02 RX ADMIN — SODIUM CHLORIDE, PRESERVATIVE FREE 3 ML: 5 INJECTION INTRAVENOUS at 21:55

## 2019-06-02 RX ADMIN — TAZOBACTAM SODIUM AND PIPERACILLIN SODIUM 3.38 G: 375; 3 INJECTION, SOLUTION INTRAVENOUS at 06:36

## 2019-06-02 RX ADMIN — CALCIUM CARBONATE 1 TABLET: 500 TABLET ORAL at 05:28

## 2019-06-02 RX ADMIN — TAZOBACTAM SODIUM AND PIPERACILLIN SODIUM 3.38 G: 375; 3 INJECTION, SOLUTION INTRAVENOUS at 22:04

## 2019-06-02 RX ADMIN — MELATONIN TAB 5 MG 5 MG: 5 TAB at 22:04

## 2019-06-02 RX ADMIN — TRAMADOL HYDROCHLORIDE 75 MG: 50 TABLET, FILM COATED ORAL at 22:04

## 2019-06-02 NOTE — PROGRESS NOTES
INFECTIOUS DISEASE Progress Note    Sridhar Reeves  1980  [id number]    Date of Consult: 6/2/2019    Admission Date: 6/1/2019      Requesting Provider: Norma Monge MD  Evaluating Physician: Josue Ramirez MD    Reason for Consultation: Peritonsillar abscess    History of present illness:    Patient is a 38 y.o. male with h/o Strep throat since he was a child who we are to see for a evolving left peritonsillar abscess/phlegmon.  The patient presented with sore throat and odynophagia that started about a day ago.  He is having difficulty swallowing.  The symptoms started as an itchy sensation at the back of his throat.  He does have recurrent tonsillitis and strep throat since was a child, but worse (3 times) since he had his left lower wisdom tooth extracted about a year ago.  He usually improve on oral antibiotic with last being Keflex.  He has been referred to ENT but did not have insurance to make the visit until recently.  He works as a  and smokes about 1/4 to 1/2 ppd.  He has 4 children at home and they have been passing around viral throat and ear infections.  His youngest child is 19 months old with similar symptoms and has been sleeping with he and his wife. He denies fever, chills, shortness of breath, cough, nausea, vomiting, diarrhea, or dysuria.  Work up shows Tmax 101.4, lactic acid 0.8,  and WBC 13,800 with 75% neutrophils.  Blood cultures are pending. Strep A screen is negative with culture pending and mono screen is ordered.   A CT scan of neck showed left tonsillar, peritonsillar, and parapharyngeal soft tissue swelling and edema c/w tonsillitis with a focus of developing left peritonsillar abscess/phlegmon about 2.1 x 1.5 cm in size.  He was started on Zosyn.  ID was asked to evaluate and manage his antibiotic therapy.     He feels much better since being admitted and started on IV antibiotics last night.     6/2: Continue slow improvement overnight.  Afebrile.  His throat  swelling is improved.  He is able to eat meals without significant odynophagia.  He is tolerating IV antibiotics well    ROS:  No fevers or chills. No n/v/d. No rash. No new ADR to Abx.       No Known Allergies      Medication:    Current Facility-Administered Medications:   •  acetaminophen (TYLENOL) tablet 650 mg, 650 mg, Oral, Q4H PRN, Suhail Rucker PA-C  •  calcium carbonate (TUMS) chewable tablet 500 mg (200 mg elemental), 1 tablet, Oral, TID PRN, Suhail Rucker PA-C, 1 tablet at 06/02/19 0528  •  dexamethasone (DECADRON) injection 4 mg, 4 mg, Intravenous, Q8H, Norma Monge MD, 4 mg at 06/02/19 0922  •  melatonin tablet 5 mg, 5 mg, Oral, Nightly PRN, Suhail Rucker PA-C  •  ondansetron (ZOFRAN) injection 4 mg, 4 mg, Intravenous, Q6H PRN, Suhail Rucker PA-C  •  piperacillin-tazobactam (ZOSYN) 3.375 g in iso-osmotic dextrose 50 ml (premix), 3.375 g, Intravenous, Q8H, Josue Ramirez MD, 3.375 g at 06/02/19 1318  •  [COMPLETED] Insert peripheral IV, , , Once **AND** sodium chloride 0.9 % flush 10 mL, 10 mL, Intravenous, PRN, Yadiel Fink, DO  •  sodium chloride 0.9 % flush 3 mL, 3 mL, Intravenous, Q12H, Suhail Rucker PA-C, 3 mL at 06/02/19 0922  •  sodium chloride 0.9 % flush 3-10 mL, 3-10 mL, Intravenous, PRN, Suhail Rucker PA-C  •  traMADol (ULTRAM) tablet 75 mg, 75 mg, Oral, Q6H PRN, Norma Monge MD, 75 mg at 06/02/19 0928    Antibiotics:  Anti-Infectives (From admission, onward)    Ordered     Dose/Rate Route Frequency Start Stop    06/01/19 1149  piperacillin-tazobactam (ZOSYN) 3.375 g in iso-osmotic dextrose 50 ml (premix)     Ordering Provider:  Josue Ramirez MD    3.375 g  over 4 Hours Intravenous Every 8 Hours Scheduled 06/01/19 1245 06/08/19 1359    06/01/19 0117  piperacillin-tazobactam (ZOSYN) 4.5 g in iso-osmotic dextrose 100 mL IVPB (premix)     Ordering Provider:  Yadiel Fink DO    4.5 g  over 30 Minutes Intravenous Once 06/01/19 0119 06/01/19 0318           Physical Exam:   Vital Signs  Temp (24hrs), Av.7 °F (36.5 °C), Min:97.3 °F (36.3 °C), Max:97.9 °F (36.6 °C)    Temp  Min: 97.3 °F (36.3 °C)  Max: 97.9 °F (36.6 °C)  BP  Min: 112/85  Max: 118/78  Pulse  Min: 49  Max: 71  Resp  Min: 14  Max: 18  SpO2  Min: 92 %  Max: 92 %    GENERAL: Awake and alert, in no acute distress.   HEENT: Normocephalic, atraumatic.  PERRL. EOMI. No conjunctival injection. No icterus. Oropharynx with significantly enlarged left tonsil with surrounding erythema. No purulent drainage appreciated. No evidence of peridontal disease. Overall improved today   NECK: Supple without nuchal rigidity   LYMPH: Submandibular/cervical lymphadenopathy with moderate pain on palation   HEART: RRR; No murmur, rubs, gallops.   LUNGS: Clear to auscultation bilaterally without wheezing, rales, rhonchi. Normal respiratory effort. Nonlabored. No dullness.  ABDOMEN: Soft, nontender, nondistended. Positive bowel sounds. No rebound or guarding. NO mass or HSM.  EXT:  No cyanosis, clubbing or edema.   :   Without Pino catheter.  MSK: FROM without joint effusions noted arms/legs.    SKIN: Warm and dry without cutaneous eruptions on Inspection/palpation.    NEURO: Oriented to PPT. No focal deficits on motor/sensory exam at arms/legs.  PSYCHIATRIC: Normal insight and judgement. Cooperative with PE    Laboratory Data    Results from last 7 days   Lab Units 19  0738 19  0947 19  0148   WBC 10*3/mm3 16.92* 13.95* 13.84*   HEMOGLOBIN g/dL 12.5* 12.7* 12.7*   HEMATOCRIT % 39.5 40.2 38.9   PLATELETS 10*3/mm3 275 256 254     Results from last 7 days   Lab Units 19  0947   SODIUM mmol/L 137   POTASSIUM mmol/L 4.1   CHLORIDE mmol/L 103   CO2 mmol/L 21.0*   BUN mg/dL 10   CREATININE mg/dL 0.85   GLUCOSE mg/dL 161*   CALCIUM mg/dL 8.8     Results from last 7 days   Lab Units 19  0148   ALK PHOS U/L 87   BILIRUBIN mg/dL 0.9   ALT (SGPT) U/L 11   AST (SGOT) U/L 17             Results from last  7 days   Lab Units 06/01/19  1044   LACTATE mmol/L 0.8             Estimated Creatinine Clearance: 157.3 mL/min (by C-G formula based on SCr of 0.85 mg/dL).      Microbiology:      Blood cx pending, NGTD      Strep A screen negative. culture pending  Monospot negative         Radiology:  Imaging Results (last 72 hours)     Procedure Component Value Units Date/Time    CT Soft Tissue Neck With Contrast [[id number]] Collected:  06/01/19 0224     Updated:  06/01/19 0226    Narrative:       CT Neck Soft Tissue W    HISTORY:   Left upper dental pain and facial swelling    TECHNIQUE:   CT of the neck, soft tissues with 75 cc Isovue-300 contrast. Coronal and sagittal reconstructions were obtained. Radiation dose reduction techniques included automated exposure control or exposure modulation based on body size. Count of known CT and  cardiac nuc med studies performed in previous 12 months: 0.     COMPARISON:   None available.    FINDINGS:   Left tonsillar and peritonsillar soft tissue swelling as well as soft tissue swelling and edema extending into the left parapharyngeal space. Foci of decreased attenuation without distinct peripheral enhancement may represent developing left  peritonsillar abscess. 2.1 x 1.5 cm low-attenuation collection left tonsillar pillars. A second less defined hypodensity noted in the left parapharyngeal soft tissues measuring 2.8 x 1.8 cm. Some mass effect on the nasopharynx and oropharynx but no  definite airway obstruction. No definite involvement of the floor the mouth or base of tongue.    Moderate amount of bilateral cervical lymphadenopathy left greater than right. Prominent left submandibular lymph nodes. The parotid, submandibular and thyroid glands appear intact. Head and neck vasculature are unremarkable. Findings suggesting  periodontal disease but no definite periodontal abscess. No definite dental caries. Chronic maxillary and ethmoid sinus mucosal disease. Visualized brain parenchyma  and skull base unremarkable. Epiglottis unremarkable.      Impression:       Left tonsillar, peritonsillar and parapharyngeal soft tissue swelling and edema consistent with active inflammatory process (tonsillitis) with some foci of decreased attenuation suggesting developing left peritonsillar abscess/phlegmon.    Evidence of periodontal disease but no definite periodontal abscess.    Extensive bilateral cervical lymphadenopathy left greater than right.          Signer Name: MIGUEL Tucker MD   Signed: 6/1/2019 2:24 AM   Workstation Name: [id number]           I personally reviewed the above CT scan images      Impression:   - Acute left peritonsillar abscess/phlegmon secondary to tonsillitis.  Usual organisms are viridans strep, anaerobes, strep A. Improving on IV zosyn  - Acute left tonsillitis  - Fever, sepsis, secondary to above: improved. Blood cx negative to date  - neutrophilic leukocytosis   - H/o recurrent Strep throat and tonsillitis    PLAN/RECOMMENDATIONS:   Thank you for asking us to see Sridhar Reeves, I recommend the following:    - Monitor blood cultures.  Awaiting strep throat culture  - repeat CBC tomorrow    - Currently on zosyn. Attempted to de-escalate to Unasyn but it is on back-order. Will continue Zosyn which provides more than adequate coverage    - appreciate ENT evaluation    If he continues to improve overnight and blood cultures remain negative, likely can switch to oral Augmentin tomorrow to complete a 14-day course as an outpatient.       Josue Ramirez MD  6/2/2019  1:37 PM

## 2019-06-02 NOTE — PLAN OF CARE
Problem: Infection, Risk/Actual (Adult)  Goal: Identify Related Risk Factors and Signs and Symptoms   06/02/19 0613   Infection, Risk/Actual (Adult)   Related Risk Factors (Infection, Risk/Actual) chronic illness/condition;treatment plan   Signs and Symptoms (Infection, Risk/Actual) lab value changes;malaise;pain;chills     Goal: Infection Prevention/Resolution   06/02/19 0613   Infection, Risk/Actual (Adult)   Infection Prevention/Resolution making progress toward outcome

## 2019-06-02 NOTE — PROGRESS NOTES
[facility] Medicine Services  PROGRESS NOTE    Patient Name: Sridhar Reeves  : 1980  MRN: [id number]    Date of Admission: 2019  Length of Stay: 1  Primary Care Physician: Provider, No Known    Subjective   Subjective     CC:  Voice change, difficulty breathing    HPI:  Patient says his voice is now normalizing.  He is having less difficulty breathing.  He is also swallowing better.  He is very pleased with his progress.  He still notes some discomfort when he swallows or talks.  No other new complaints reported.    Review of Systems  No current fevers or chills  No current shortness of breath or cough  No current nausea, vomiting, or diarrhea  No current chest pain or palpitations    Objective   Objective     Vital Signs:   Temp:  [97.5 °F (36.4 °C)-98 °F (36.7 °C)] 97.5 °F (36.4 °C)  Heart Rate:  [58-71] 63  Resp:  [16-24] 18  BP: (114-125)/(66-84) 115/66        Physical Exam:  Constitutional:Awake, alert  HENT: NCAT, mucous membranes moist, neck supple  Respiratory: Clear to auscultation bilaterally, respiratory effort normal, nonlabored breathing   Cardiovascular: RRR, S1, S2, normal radial pulses  Gastrointestinal: Positive bowel sounds, soft, nontender, nondistended  Musculoskeletal: Normal musculature for age, no lower extremity edema, BMI 27  Psychiatric: Appropriate affect, cooperative, conversational  Neurologic: No slurred speech or facial droop, follows commands, not confused   Skin: No rashes or jaundice, warm    Results Reviewed:  I have personally reviewed current lab, radiology, and data and agree.    Results from last 7 days   Lab Units 19  0947 19  0148   WBC 10*3/mm3 13.95* 13.84*   HEMOGLOBIN g/dL 12.7* 12.7*   HEMATOCRIT % 40.2 38.9   PLATELETS 10*3/mm3 256 254     Results from last 7 days   Lab Units 19  0947 19  0148   SODIUM mmol/L 137 141   POTASSIUM mmol/L 4.1 4.3   CHLORIDE mmol/L 103 103   CO2 mmol/L 21.0* 26.0   BUN mg/dL 10  12   CREATININE mg/dL 0.85 0.95   GLUCOSE mg/dL 161* 118*   CALCIUM mg/dL 8.8 9.3   ALT (SGPT) U/L  --  11   AST (SGOT) U/L  --  17     Estimated Creatinine Clearance: 157.3 mL/min (by C-G formula based on SCr of 0.85 mg/dL).    Microbiology Results Abnormal     Procedure Component Value - Date/Time    Rapid Strep A Screen - Swab, Throat [[id number]]  (Normal) Collected:  06/01/19 1018    Lab Status:  Final result Specimen:  Swab from Throat Updated:  06/01/19 1127     Strep A Ag Negative    Narrative:       Test performed by Direct Antigen Testing.          Imaging Results (last 24 hours)     ** No results found for the last 24 hours. **               I have reviewed the medications:    Current Facility-Administered Medications:   •  acetaminophen (TYLENOL) tablet 650 mg, 650 mg, Oral, Q4H PRN, Suhail Rucker PA-C  •  calcium carbonate (TUMS) chewable tablet 500 mg (200 mg elemental), 1 tablet, Oral, TID PRN, Suhail Rucker PA-C, 1 tablet at 06/02/19 0528  •  dexamethasone (DECADRON) injection 4 mg, 4 mg, Intravenous, Q8H, Norma Monge MD, 4 mg at 06/02/19 0407  •  melatonin tablet 5 mg, 5 mg, Oral, Nightly PRN, Suhail Rucker PA-C  •  ondansetron (ZOFRAN) injection 4 mg, 4 mg, Intravenous, Q6H PRN, Suhail Rucker PA-C  •  piperacillin-tazobactam (ZOSYN) 3.375 g in iso-osmotic dextrose 50 ml (premix), 3.375 g, Intravenous, Q8H, Josue Ramirez MD, 3.375 g at 06/02/19 0636  •  [COMPLETED] Insert peripheral IV, , , Once **AND** sodium chloride 0.9 % flush 10 mL, 10 mL, Intravenous, PRN, Yadiel Fink, DO  •  sodium chloride 0.9 % flush 3 mL, 3 mL, Intravenous, Q12H, Suhail Rucker PA-C, 3 mL at 06/01/19 2207  •  sodium chloride 0.9 % flush 3-10 mL, 3-10 mL, Intravenous, PRN, Suhail Rucker PA-C  •  traMADol (ULTRAM) tablet 75 mg, 75 mg, Oral, Q6H PRN, Norma Monge MD, 75 mg at 06/01/19 4133      Assessment/Plan   Assessment / Plan     Active Hospital Problems    Diagnosis POA   •  **Peritonsillar abscess [J36] Yes   • Tobacco abuse [Z72.0] Yes   • Tonsillitis [J03.90] Yes   • Sepsis, unspecified organism (CMS/HCC) [A41.9] Yes   • Acute sore throat [J02.9] Yes          Brief Hospital Course to date:  Sridhar Reeves is a 38 y.o. male who presented to the hospital with difficulty breathing, swallowing, and speaking.  Imaging confirmed tonsillitis with abscess.  ENT and infectious disease have evaluated.  Patient has been closely monitored and swallowing and breathing have improved.  ENT is recommending antibiotic treatment for now and eventual outpatient tonsillectomy.    Plan:  Continue Zosyn for treatment of tonsillitis and peritonsillar abscess.    Monitor for signs of worsening breathing or other concerning issues.  Overall clinically improving.    Tobacco cessation counseled.    Continue symptom treatment and supportive care.    Possible discharge in 1 to 2 days pending further clinical improvement and outpatient antibiotic plan from infectious disease.    Patient will follow-up with ENT for eventual tonsillectomy.    DVT Prophylaxis: Ambulatory    Disposition: I expect the patient to be discharged TBD    CODE STATUS:   Code Status and Medical Interventions:   Ordered at: 06/01/19 0454     Level Of Support Discussed With:    Patient     Code Status:    CPR     Medical Interventions (Level of Support Prior to Arrest):    Full         Electronically signed by Carlito Bradford MD, 06/02/19, 7:03 AM.

## 2019-06-02 NOTE — PLAN OF CARE
Problem: Patient Care Overview  Goal: Plan of Care Review  Outcome: Ongoing (interventions implemented as appropriate)   06/02/19 1421   Coping/Psychosocial   Plan of Care Reviewed With patient   Plan of Care Review   Progress improving   OTHER   Outcome Summary Patient resting in bed. Medicated once for throat pain. Tolerating PO intake with minimual complaints. Room air. No shortness of breath noted or reported. Call light in reach to make needs known.       Problem: Infection, Risk/Actual (Adult)  Goal: Identify Related Risk Factors and Signs and Symptoms  Outcome: Ongoing (interventions implemented as appropriate)

## 2019-06-03 VITALS
RESPIRATION RATE: 14 BRPM | DIASTOLIC BLOOD PRESSURE: 74 MMHG | HEART RATE: 70 BPM | TEMPERATURE: 97.7 F | BODY MASS INDEX: 26.95 KG/M2 | WEIGHT: 210 LBS | OXYGEN SATURATION: 92 % | HEIGHT: 74 IN | SYSTOLIC BLOOD PRESSURE: 119 MMHG

## 2019-06-03 PROBLEM — R13.10 DYSPHAGIA: Status: ACTIVE | Noted: 2019-06-03

## 2019-06-03 PROBLEM — R13.10 DYSPHAGIA: Status: RESOLVED | Noted: 2019-06-03 | Resolved: 2019-06-03

## 2019-06-03 LAB
BACTERIA SPEC AEROBE CULT: NORMAL
DEPRECATED RDW RBC AUTO: 46.1 FL (ref 37–54)
ERYTHROCYTE [DISTWIDTH] IN BLOOD BY AUTOMATED COUNT: 13.8 % (ref 12.3–15.4)
HCT VFR BLD AUTO: 37 % (ref 37.5–51)
HGB BLD-MCNC: 12.1 G/DL (ref 13–17.7)
MCH RBC QN AUTO: 29.7 PG (ref 26.6–33)
MCHC RBC AUTO-ENTMCNC: 32.7 G/DL (ref 31.5–35.7)
MCV RBC AUTO: 90.7 FL (ref 79–97)
PLATELET # BLD AUTO: 286 10*3/MM3 (ref 140–450)
PMV BLD AUTO: 10.5 FL (ref 6–12)
RBC # BLD AUTO: 4.08 10*6/MM3 (ref 4.14–5.8)
WBC NRBC COR # BLD: 16.58 10*3/MM3 (ref 3.4–10.8)

## 2019-06-03 PROCEDURE — 85027 COMPLETE CBC AUTOMATED: CPT | Performed by: INTERNAL MEDICINE

## 2019-06-03 PROCEDURE — 25010000002 PIPERACILLIN SOD-TAZOBACTAM PER 1 G: Performed by: INTERNAL MEDICINE

## 2019-06-03 PROCEDURE — 25010000002 DEXAMETHASONE PER 1 MG: Performed by: FAMILY MEDICINE

## 2019-06-03 PROCEDURE — 99238 HOSP IP/OBS DSCHRG MGMT 30/<: CPT | Performed by: INTERNAL MEDICINE

## 2019-06-03 RX ORDER — AMOXICILLIN AND CLAVULANATE POTASSIUM 875; 125 MG/1; MG/1
1 TABLET, FILM COATED ORAL EVERY 12 HOURS SCHEDULED
Qty: 20 TABLET | Refills: 0 | Status: SHIPPED | OUTPATIENT
Start: 2019-06-03 | End: 2019-06-13

## 2019-06-03 RX ORDER — AMOXICILLIN AND CLAVULANATE POTASSIUM 875; 125 MG/1; MG/1
1 TABLET, FILM COATED ORAL EVERY 12 HOURS SCHEDULED
Status: DISCONTINUED | OUTPATIENT
Start: 2019-06-03 | End: 2019-06-03 | Stop reason: HOSPADM

## 2019-06-03 RX ORDER — ACETAMINOPHEN 325 MG/1
650 TABLET ORAL EVERY 4 HOURS PRN
Start: 2019-06-03

## 2019-06-03 RX ADMIN — TAZOBACTAM SODIUM AND PIPERACILLIN SODIUM 3.38 G: 375; 3 INJECTION, SOLUTION INTRAVENOUS at 06:56

## 2019-06-03 RX ADMIN — SODIUM CHLORIDE, PRESERVATIVE FREE 3 ML: 5 INJECTION INTRAVENOUS at 09:48

## 2019-06-03 RX ADMIN — DEXAMETHASONE SODIUM PHOSPHATE 4 MG: 4 INJECTION, SOLUTION INTRA-ARTICULAR; INTRALESIONAL; INTRAMUSCULAR; INTRAVENOUS; SOFT TISSUE at 02:50

## 2019-06-03 RX ADMIN — DEXAMETHASONE SODIUM PHOSPHATE 4 MG: 4 INJECTION, SOLUTION INTRA-ARTICULAR; INTRALESIONAL; INTRAMUSCULAR; INTRAVENOUS; SOFT TISSUE at 09:48

## 2019-06-03 RX ADMIN — AMOXICILLIN AND CLAVULANATE POTASSIUM 1 TABLET: 875; 125 TABLET, FILM COATED ORAL at 09:48

## 2019-06-03 NOTE — PROGRESS NOTES
INFECTIOUS DISEASE Progress Note    Sridhar Reeves  1980  [id number]    Date of Consult: 6/3/2019    Admission Date: 6/1/2019      Requesting Provider: Norma Monge MD  Evaluating Physician: Josue Ramirez MD    Reason for Consultation: Peritonsillar abscess    History of present illness:    Patient is a 38 y.o. male with h/o Strep throat since he was a child who we are to see for a evolving left peritonsillar abscess/phlegmon.  The patient presented with sore throat and odynophagia that started about a day ago.  He is having difficulty swallowing.  The symptoms started as an itchy sensation at the back of his throat.  He does have recurrent tonsillitis and strep throat since was a child, but worse (3 times) since he had his left lower wisdom tooth extracted about a year ago.  He usually improve on oral antibiotic with last being Keflex.  He has been referred to ENT but did not have insurance to make the visit until recently.  He works as a  and smokes about 1/4 to 1/2 ppd.  He has 4 children at home and they have been passing around viral throat and ear infections.  His youngest child is 19 months old with similar symptoms and has been sleeping with he and his wife. He denies fever, chills, shortness of breath, cough, nausea, vomiting, diarrhea, or dysuria.  Work up shows Tmax 101.4, lactic acid 0.8,  and WBC 13,800 with 75% neutrophils.  Blood cultures are pending. Strep A screen is negative with culture pending and mono screen is ordered.   A CT scan of neck showed left tonsillar, peritonsillar, and parapharyngeal soft tissue swelling and edema c/w tonsillitis with a focus of developing left peritonsillar abscess/phlegmon about 2.1 x 1.5 cm in size.  He was started on Zosyn.  ID was asked to evaluate and manage his antibiotic therapy.     He feels much better since being admitted and started on IV antibiotics last night.     6/2: Continue slow improvement overnight.  Afebrile.  His throat  swelling is improved.  He is able to eat meals without significant odynophagia.  He is tolerating IV antibiotics well    6/3: improving. Afebrile. Less throat pain and swelling. Tolerating regular diet and swallowing pills well.    ROS:  No fevers or chills. No n/v/d. No rash. No new ADR to Abx.       No Known Allergies      Medication:    Current Facility-Administered Medications:   •  acetaminophen (TYLENOL) tablet 650 mg, 650 mg, Oral, Q4H PRN, Suhail Rucker PA-C  •  amoxicillin-clavulanate (AUGMENTIN) 875-125 MG per tablet 1 tablet, 1 tablet, Oral, Q12H, Josue Ramirez MD  •  calcium carbonate (TUMS) chewable tablet 500 mg (200 mg elemental), 1 tablet, Oral, TID PRN, Suhail Rucker PA-C, 1 tablet at 06/02/19 0528  •  dexamethasone (DECADRON) injection 4 mg, 4 mg, Intravenous, Q8H, Norma Monge MD, 4 mg at 06/03/19 0250  •  melatonin tablet 5 mg, 5 mg, Oral, Nightly PRN, Suhail Rucker PA-C, 5 mg at 06/02/19 2204  •  ondansetron (ZOFRAN) injection 4 mg, 4 mg, Intravenous, Q6H PRN, Suhail Rucker PA-C  •  [COMPLETED] Insert peripheral IV, , , Once **AND** sodium chloride 0.9 % flush 10 mL, 10 mL, Intravenous, PRN, Yadiel Fink, DO  •  sodium chloride 0.9 % flush 3 mL, 3 mL, Intravenous, Q12H, Suhail Rucker PA-C, 3 mL at 06/02/19 2155  •  sodium chloride 0.9 % flush 3-10 mL, 3-10 mL, Intravenous, PRN, Suhail Rucker PA-C  •  traMADol (ULTRAM) tablet 75 mg, 75 mg, Oral, Q6H PRN, Norma Monge MD, 75 mg at 06/02/19 2204    Antibiotics:  Anti-Infectives (From admission, onward)    Ordered     Dose/Rate Route Frequency Start Stop    06/03/19 0849  amoxicillin-clavulanate (AUGMENTIN) 875-125 MG per tablet 1 tablet     Ordering Provider:  Josue Ramirez MD    1 tablet Oral Every 12 Hours Scheduled 06/03/19 0945 06/14/19 0859    06/01/19 0117  piperacillin-tazobactam (ZOSYN) 4.5 g in iso-osmotic dextrose 100 mL IVPB (premix)     Ordering Provider:  Yadiel Fink DO    4.5 g  over 30  Minutes Intravenous Once 19 0119 19 0318          Physical Exam:   Vital Signs  Temp (24hrs), Av.8 °F (36.6 °C), Min:97.5 °F (36.4 °C), Max:98 °F (36.7 °C)    Temp  Min: 97.5 °F (36.4 °C)  Max: 98 °F (36.7 °C)  BP  Min: 106/75  Max: 122/74  Pulse  Min: 43  Max: 71  Resp  Min: 14  Max: 18  No Data Recorded    GENERAL: Awake and alert, in no acute distress.   HEENT: Normocephalic, atraumatic.  PERRL. EOMI. No conjunctival injection. No icterus. Oropharynx with enlarged left tonsil improved from before. Right tonsil also becoming enlarged. No purulent drainage appreciated. No evidence of peridontal disease. Overall improved today   NECK: Supple without nuchal rigidity   LYMPH: Submandibular/cervical lymphadenopathy with no tenderness to palation   HEART: RRR; No murmur, rubs, gallops.   LUNGS: Clear to auscultation bilaterally without wheezing, rales, rhonchi. Normal respiratory effort. Nonlabored. No dullness.  ABDOMEN: Soft, nontender, nondistended. Positive bowel sounds. No rebound or guarding.   EXT:  No cyanosis, clubbing or edema.   :   Without Pino catheter.  MSK: FROM without joint effusions noted arms/legs.    SKIN: Warm and dry without cutaneous eruptions on Inspection/palpation.    NEURO: Oriented to PPT. No focal deficits on motor/sensory exam at arms/legs.  PSYCHIATRIC: Normal insight and judgement. Cooperative with PE    Laboratory Data    Results from last 7 days   Lab Units 19  0658 19  0738 19  0947   WBC 10*3/mm3 16.58* 16.92* 13.95*   HEMOGLOBIN g/dL 12.1* 12.5* 12.7*   HEMATOCRIT % 37.0* 39.5 40.2   PLATELETS 10*3/mm3 286 275 256     Results from last 7 days   Lab Units 19  0947   SODIUM mmol/L 137   POTASSIUM mmol/L 4.1   CHLORIDE mmol/L 103   CO2 mmol/L 21.0*   BUN mg/dL 10   CREATININE mg/dL 0.85   GLUCOSE mg/dL 161*   CALCIUM mg/dL 8.8     Results from last 7 days   Lab Units 19  0148   ALK PHOS U/L 87   BILIRUBIN mg/dL 0.9   ALT (SGPT) U/L 11    AST (SGOT) U/L 17             Results from last 7 days   Lab Units 06/01/19  1044   LACTATE mmol/L 0.8             Estimated Creatinine Clearance: 157.3 mL/min (by C-G formula based on SCr of 0.85 mg/dL).      Microbiology:      Blood cx pending, NGTD      Strep A screen negative. culture pending but negative to date  Monospot negative         Radiology:  Imaging Results (last 72 hours)     Procedure Component Value Units Date/Time    CT Soft Tissue Neck With Contrast [[id number]] Collected:  06/01/19 0224     Updated:  06/01/19 0226    Narrative:       CT Neck Soft Tissue W    HISTORY:   Left upper dental pain and facial swelling    TECHNIQUE:   CT of the neck, soft tissues with 75 cc Isovue-300 contrast. Coronal and sagittal reconstructions were obtained. Radiation dose reduction techniques included automated exposure control or exposure modulation based on body size. Count of known CT and  cardiac nuc med studies performed in previous 12 months: 0.     COMPARISON:   None available.    FINDINGS:   Left tonsillar and peritonsillar soft tissue swelling as well as soft tissue swelling and edema extending into the left parapharyngeal space. Foci of decreased attenuation without distinct peripheral enhancement may represent developing left  peritonsillar abscess. 2.1 x 1.5 cm low-attenuation collection left tonsillar pillars. A second less defined hypodensity noted in the left parapharyngeal soft tissues measuring 2.8 x 1.8 cm. Some mass effect on the nasopharynx and oropharynx but no  definite airway obstruction. No definite involvement of the floor the mouth or base of tongue.    Moderate amount of bilateral cervical lymphadenopathy left greater than right. Prominent left submandibular lymph nodes. The parotid, submandibular and thyroid glands appear intact. Head and neck vasculature are unremarkable. Findings suggesting  periodontal disease but no definite periodontal abscess. No definite dental caries. Chronic  maxillary and ethmoid sinus mucosal disease. Visualized brain parenchyma and skull base unremarkable. Epiglottis unremarkable.      Impression:       Left tonsillar, peritonsillar and parapharyngeal soft tissue swelling and edema consistent with active inflammatory process (tonsillitis) with some foci of decreased attenuation suggesting developing left peritonsillar abscess/phlegmon.    Evidence of periodontal disease but no definite periodontal abscess.    Extensive bilateral cervical lymphadenopathy left greater than right.          Signer Name: MIGUEL Tucker MD   Signed: 6/1/2019 2:24 AM   Workstation Name: [id number]           I personally reviewed the above CT scan images      Impression:   - Acute left peritonsillar abscess/phlegmon secondary to tonsillitis.  Usual organisms are viridans strep, anaerobes, strep A. Strep screen and cultures are negative so far. Significantly improved on IV zosyn. Blood cx negative. Will switch to PO abx  - Acute left tonsillitis  - Fever, sepsis, secondary to above: improved. Blood cx negative to date  - neutrophilic leukocytosis: stable  - H/o recurrent Strep throat and tonsillitis    PLAN/RECOMMENDATIONS:   Thank you for asking us to see Sridhar Reeves, I recommend the following:    - Monitor blood cultures and final throat culture    - He is significantly improved. Ok to switch to PO abx.   - stop zosyn  - Start Augmentin 875-125 mg BID. Plan to complete 14 day total course. Script left on chart for when ready for discharge  - ok to discharge home today from my perspective if cleared by other services. Notified Dr Bradford.  - follow up in ID clinic in 1 week. Called office to make appointment   - follow up in ENT clinic to discuss tonsillectomy     Josue Ramirez MD  6/3/2019  8:49 AM

## 2019-06-03 NOTE — PLAN OF CARE
Problem: Infection, Risk/Actual (Adult)  Goal: Identify Related Risk Factors and Signs and Symptoms   06/03/19 0616   Infection, Risk/Actual (Adult)   Related Risk Factors (Infection, Risk/Actual) exposure to microbes   Signs and Symptoms (Infection, Risk/Actual) malaise;pain;edema;chills     Goal: Infection Prevention/Resolution   06/03/19 0616   Infection, Risk/Actual (Adult)   Infection Prevention/Resolution making progress toward outcome

## 2019-06-03 NOTE — DISCHARGE SUMMARY
[facility] Medicine Services  DISCHARGE SUMMARY    Patient Name: Sridhar Reeves  : 1980  MRN: [id number]    Date of Admission: 2019  Date of Discharge:  6/3/2019  Primary Care Physician: Provider, No Known    Consults     Date and Time Order Name Status Description    2019 0504 Inpatient ENT Consult Completed     2019 0504 Inpatient Infectious Diseases Consult Completed           Hospital Course     Presenting Problem:   Acute sore throat [J02.9]    Active Hospital Problems    Diagnosis  POA   • **Peritonsillar abscess [J36]  Yes   • Tobacco abuse [Z72.0]  Yes   • Tonsillitis [J03.90]  Yes   • Sepsis, unspecified organism (CMS/[facility]) [A41.9]  Yes   • Acute sore throat [J02.9]  Yes      Resolved Hospital Problems    Diagnosis Date Resolved POA   • Dysphagia due to abscess, now resolved.   [R13.10] 2019 Yes          Hospital Course:  Sridhar Reeves is a 38 y.o. male   who presented to the hospital with difficulty breathing, swallowing, and speaking.  Imaging confirmed tonsillitis with abscess.  ENT and infectious disease have evaluated.  Patient has been closely monitored and swallowing and breathing have improved.  ENT is recommending antibiotic treatment for now and eventual outpatient tonsillectomy. Improved from abscess gradually on Zosyn.  Seen by ID who has changed abx to Augmentin and cleared him for DC.  Follow up with ID and ENT.  Eating well.  Voice much better.  Appears stable for DC.      At the time of discharge patient was told to take all medications as prescribed, keep all follow-up appointments, and call their doctor or return to the hospital with any worsening or concerning symptoms.    Please note that dragon voice recognition software was used to create this note and that transcription errors are possible.     Tobacco cessation counseled.     Patient will follow-up with ENT for eventual tonsillectomy.    Discharge Follow Up Recommendations for  labs/diagnostics:   ID in 1 week    ENT in 2-4 weeks    Day of Discharge     HPI:   Wants DC today.  Eating well.  Voice is back to normal.    Review of Systems  No current fevers or chills  No current shortness of breath or cough  No current nausea, vomiting, or diarrhea  No current chest pain or palpitations    Vital Signs:   Temp:  [97.5 °F (36.4 °C)-98 °F (36.7 °C)] 97.7 °F (36.5 °C)  Heart Rate:  [43-71] 43  Resp:  [14-18] 14  BP: (106-122)/(74-75) 119/74     Physical Exam:  Constitutional:Awake, alert  HENT: NCAT, mucous membranes moist, neck supple  Respiratory: respiratory effort normal, nonlabored breathing   Cardiovascular: normal radial pulses  Gastrointestinal: soft, nontender, nondistended  Musculoskeletal: Normal musculature for age, no lower extremity edema, BMI 26  Psychiatric: Appropriate affect, cooperative, conversational  Neurologic: No slurred speech or facial droop, follows commands, not confused   Skin: No rashes or jaundice, warm      Pertinent  and/or Most Recent Results     Results from last 7 days   Lab Units 06/03/19  0658 06/02/19  0738 06/01/19  0947 06/01/19  0148   WBC 10*3/mm3 16.58* 16.92* 13.95* 13.84*   HEMOGLOBIN g/dL 12.1* 12.5* 12.7* 12.7*   HEMATOCRIT % 37.0* 39.5 40.2 38.9   PLATELETS 10*3/mm3 286 275 256 254   SODIUM mmol/L  --   --  137 141   POTASSIUM mmol/L  --   --  4.1 4.3   CHLORIDE mmol/L  --   --  103 103   CO2 mmol/L  --   --  21.0* 26.0   BUN mg/dL  --   --  10 12   CREATININE mg/dL  --   --  0.85 0.95   GLUCOSE mg/dL  --   --  161* 118*   CALCIUM mg/dL  --   --  8.8 9.3     Results from last 7 days   Lab Units 06/01/19  0148   BILIRUBIN mg/dL 0.9   ALK PHOS U/L 87   ALT (SGPT) U/L 11   AST (SGOT) U/L 17           Invalid input(s): TG, LDLCALC, LDLREALC  Results from last 7 days   Lab Units 06/01/19  1044   LACTATE mmol/L 0.8       Brief Urine Lab Results     None          Microbiology Results Abnormal     Procedure Component Value - Date/Time    Blood Culture -  Blood, Hand, Right [[id number]] Collected:  06/01/19 0946    Lab Status:  Preliminary result Specimen:  Blood from Hand, Right Updated:  06/03/19 1116     Blood Culture No growth at 2 days    Blood Culture - Blood, Hand, Left [[id number]] Collected:  06/01/19 0947    Lab Status:  Preliminary result Specimen:  Blood from Hand, Left Updated:  06/03/19 1116     Blood Culture No growth at 2 days    Beta Strep Culture, Throat - Swab, Throat [[id number]]  (Normal) Collected:  06/01/19 1018    Lab Status:  Final result Specimen:  Swab from Throat Updated:  06/03/19 1115     Throat Culture, Beta Strep No Beta Hemolytic Streptococcus Isolated    Narrative:       Group A Strep incidence is low in adults. Positive culture for Beta hemolytic Streptococcus species can reflect colonization and not true infection. Please correlate clinically.    Rapid Strep A Screen - Swab, Throat [[id number]]  (Normal) Collected:  06/01/19 1018    Lab Status:  Final result Specimen:  Swab from Throat Updated:  06/01/19 1127     Strep A Ag Negative    Narrative:       Test performed by Direct Antigen Testing.          Imaging Results (all)     Procedure Component Value Units Date/Time    CT Soft Tissue Neck With Contrast [[id number]] Collected:  06/01/19 0224     Updated:  06/01/19 0226    Narrative:       CT Neck Soft Tissue W    HISTORY:   Left upper dental pain and facial swelling    TECHNIQUE:   CT of the neck, soft tissues with 75 cc Isovue-300 contrast. Coronal and sagittal reconstructions were obtained. Radiation dose reduction techniques included automated exposure control or exposure modulation based on body size. Count of known CT and  cardiac nuc med studies performed in previous 12 months: 0.     COMPARISON:   None available.    FINDINGS:   Left tonsillar and peritonsillar soft tissue swelling as well as soft tissue swelling and edema extending into the left parapharyngeal space. Foci of decreased attenuation without distinct peripheral  enhancement may represent developing left  peritonsillar abscess. 2.1 x 1.5 cm low-attenuation collection left tonsillar pillars. A second less defined hypodensity noted in the left parapharyngeal soft tissues measuring 2.8 x 1.8 cm. Some mass effect on the nasopharynx and oropharynx but no  definite airway obstruction. No definite involvement of the floor the mouth or base of tongue.    Moderate amount of bilateral cervical lymphadenopathy left greater than right. Prominent left submandibular lymph nodes. The parotid, submandibular and thyroid glands appear intact. Head and neck vasculature are unremarkable. Findings suggesting  periodontal disease but no definite periodontal abscess. No definite dental caries. Chronic maxillary and ethmoid sinus mucosal disease. Visualized brain parenchyma and skull base unremarkable. Epiglottis unremarkable.      Impression:       Left tonsillar, peritonsillar and parapharyngeal soft tissue swelling and edema consistent with active inflammatory process (tonsillitis) with some foci of decreased attenuation suggesting developing left peritonsillar abscess/phlegmon.    Evidence of periodontal disease but no definite periodontal abscess.    Extensive bilateral cervical lymphadenopathy left greater than right.          Signer Name: MIGUEL Tucker MD   Signed: 6/1/2019 2:24 AM   Workstation Name: [id number]                             Order Current Status    Blood Culture - Blood, Hand, Left Preliminary result    Blood Culture - Blood, Hand, Right Preliminary result        Discharge Details        Discharge Medications      New Medications      Instructions Start Date   acetaminophen 325 MG tablet  Commonly known as:  TYLENOL   650 mg, Oral, Every 4 Hours PRN      amoxicillin-clavulanate 875-125 MG per tablet  Commonly known as:  AUGMENTIN   1 tablet, Oral, Every 12 Hours Scheduled             No Known Allergies      Discharge Disposition:  Home or Self Care    Discharge  Diet:  Diet Order   Procedures   • Diet Regular         Discharge Activity:   Activity Instructions     Activity as Tolerated              CODE STATUS:    Code Status and Medical Interventions:   Ordered at: 06/01/19 0454     Level Of Support Discussed With:    Patient     Code Status:    CPR     Medical Interventions (Level of Support Prior to Arrest):    Full         Additional Instructions for the Follow-ups that You Need to Schedule     Discharge Follow-up with Specialty: ID Dr Ramirez 1 week   As directed      Specialty:  ID Dr Ramirez 1 week         Discharge Follow-up with Specified Provider: ENT for tonsils, Dr Matias 2-4 weeks   As directed      To:  ENT for tonsils, Dr Matias 2-4 weeks               Time Spent on Discharge:  25 minutes    Electronically signed by Carlito Bradford MD, 06/03/19, 11:37 AM.

## 2019-06-04 ENCOUNTER — TELEPHONE (OUTPATIENT)
Dept: INTERNAL MEDICINE | Facility: CLINIC | Age: 39
End: 2019-06-04

## 2019-06-04 NOTE — TELEPHONE ENCOUNTER
Attempted to contact patient for hospital follow up call.  Number is not in service.  Patient's insurance doesn't participate in TCM process.

## 2019-06-05 NOTE — TELEPHONE ENCOUNTER
Attempted to contact patient for hospital follow up call.  No answer, no voicemail.  Patient's insurance doesn't participate in TCM process.  No further outreach will be attempted at this time.

## 2019-06-06 LAB
BACTERIA SPEC AEROBE CULT: NORMAL
BACTERIA SPEC AEROBE CULT: NORMAL

## 2021-07-30 NOTE — PROGRESS NOTES
Discharge Planning Assessment  [facility]     Patient Name: Sridhar Reeves  MRN: [id number]  Today's Date: 6/3/2019    Admit Date: 6/1/2019    Discharge Needs Assessment     Row Name 06/03/19 1511       Living Environment    Lives With  spouse;child(jeancarlos), dependent    Name(s) of Who Lives With Patient  SpouseMona    Current Living Arrangements  home/apartment/condo    Primary Care Provided by  self    Provides Primary Care For  no one    Family Caregiver if Needed  spouse    Family Caregiver Names  Spouse, Mona Reeves    Quality of Family Relationships  supportive;involved;helpful    Able to Return to Prior Arrangements  yes    Living Arrangement Comments  Lives with spouse in house with no steps       Resource/Environmental Concerns    Resource/Environmental Concerns  none    Transportation Concerns  car, none       Transition Planning    Patient/Family Anticipates Transition to  home with family    Patient/Family Anticipated Services at Transition  none    Transportation Anticipated  car, drives self       Discharge Needs Assessment    Readmission Within the Last 30 Days  no previous admission in last 30 days    Concerns to be Addressed  no discharge needs identified    Equipment Currently Used at Home  none    Anticipated Changes Related to Illness  none    Equipment Needed After Discharge  none        Discharge Plan     Row Name 06/03/19 1513       Plan    Plan  Plan is home with family at discharge    Patient/Family in Agreement with Plan  yes    Plan Comments  Met with patient and spouse in room to initiate initial discharge planning.  Lives in house with no steps.  Independent with ADL.  No DME.  Plan is home.  NO needs     Final Discharge Disposition Code  01 - home or self-care        Destination      No service coordination in this encounter.      Durable Medical Equipment      No service coordination in this encounter.      Dialysis/Infusion      No service coordination in this encounter.  Reena Bains M.D.            [address] [place] [address], [address] [facility], [id number] [place] [address]           Dept Phone: [phone]           Dept Fax:  [phone] [address] [facility]          Dept Phone: [phone]           Dept Fax:  [phone]      Chief Compliant:  Chief Complaint   Patient presents with    Pain     BILATERAL KNEES        History of Present Illness: This is a 65 y.o. male who presents to the clinic today for evaluation / follow up of bilateral knee pain. Patient is a 68-year-old retired [employer] worker who has knee pain for many years. Patient recently been seen by Dr. Annalisa Beebe who did obtain x-rays also gave injections. These were done in June. Patient states that he had pretty good relief with the injections although the right side started to come back a little bit. He denies any prior traumas or falls or surgeries to his knees. His knee pain is constant although better now after the injections. .       Review of Systems   Constitutional: Negative for fever, chills, sweats. Eyes: Negative for changes in vision, or pain. HENT: Negative for ear ache, epistaxis, or sore throat. Respiratory/Cardio: Negative for Chest pain, palpitations, SOB, or cough. Gastrointestinal: Negative for abdominal pain, N/V/D. Genitourinary: Negative for dysuria, frequency, urgency, or hematuria. Neurological: Negative for headache, numbness, or weakness. Integumentary: Negative for rash, itching, laceration, or abrasion. Musculoskeletal: Positive for Pain (BILATERAL KNEES)       Physical Exam:  Constitutional: Patient is oriented to person, place, and time. Patient appears well-developed and well nourished.    HENT: Negative otherwise noted  Head: Normocephalic and Atraumatic  Nose: Normal  Eyes: Conjunctivae and EOM are normal  Neck: Normal range of      Home Medical Care      No service coordination in this encounter.      Therapy      No service coordination in this encounter.      Community Resources      No service coordination in this encounter.        Expected Discharge Date and Time     Expected Discharge Date Expected Discharge Time    Sebastien 3, 2019         Demographic Summary     Row Name 06/03/19 1511       General Information    Admission Type  inpatient    Arrived From  emergency department    Referral Source  admission list    Reason for Consult  discharge planning    Preferred Language  English        Functional Status     Row Name 06/03/19 1511       Functional Status    Usual Activity Tolerance  excellent    Current Activity Tolerance  excellent       Functional Status, IADL    Medications  independent    Meal Preparation  independent    Housekeeping  independent    Laundry  independent    Shopping  independent        Psychosocial    No documentation.       Abuse/Neglect    No documentation.       Legal    No documentation.       Substance Abuse    No documentation.       Patient Forms    No documentation.           Ana Peerz, RN     motion Neck supple. Respiratory/Cardio: Effort normal. No respiratory distress. Musculoskeletal: Examination of both knees are essentially identical.  He has varus disposition both knees. He has about a 5 to 7 degree flex contracture in both sides. Motion to about 110 degrees both sides. Crepitus and grinding noted medially both sides. Ligamentously grossly intact both sides mild patellofemoral crepitus both sides. No hip rotational pain. Neurological: Patient is alert and oriented to person, place, and time. Normal strenght. No sensory deficit. Skin: Skin is warm and dry  Psychiatric: Behavior is normal. Thought content normal.  Nursing note and vitals reviewed. Labs and Imaging:     XR taken on 6/14/2021 Dr. Brady North office revealed AP lateral views of both knees. Patient has significant varus gonarthrosis of both knees essentially bone-on-bone in the left knee and approaching bone-on-bone the right knee. He has spur formation tricompartmentally as well. No orders of the defined types were placed in this encounter. Assessment and Plan:  1. Primary osteoarthritis of both knees            This is a 65 y.o. male who presents to the clinic today for evaluation / follow up of significant DJD both knees. Past History:    Current Outpatient Medications:     nystatin-triamcinolone (MYCOLOG II) 456941-2.9 UNIT/GM-% cream, Apply topically 2 times daily. , Disp: 60 g, Rfl: 0    doxycycline hyclate (VIBRA-TABS) 100 MG tablet, Take 1 tablet by mouth 2 times daily for 10 days, Disp: 20 tablet, Rfl: 0    zinc oxide (DESITIN) 40 % ointment, Apply topically three times daily, Disp: 1 Tube, Rfl: 3    bumetanide (BUMEX) 2 MG tablet, Take 1 tablet by mouth daily, Disp: 30 tablet, Rfl: 3    DULoxetine (CYMBALTA) 60 MG extended release capsule, Take 1 capsule by mouth daily, Disp: 30 capsule, Rfl: 3    baclofen (LIORESAL) 10 MG tablet, Take 1 tablet by mouth 3 times daily, Disp: 90 tablet, Rfl: 2   diclofenac sodium (VOLTAREN) 1 % GEL, Voltaren 1 % topical gel, Disp: , Rfl:     Cholecalciferol (VITAMIN D3) 125 MCG (5000 UT) CAPS, Take 1 capsule by mouth Daily, Disp: 30 capsule, Rfl: 3  Allergies   Allergen Reactions    Ace Inhibitors Other (See Comments)     cough    Voltaren [Diclofenac Sodium] Other (See Comments)     Elevated Creatinine     Social History     Socioeconomic History    Marital status:      Spouse name: Not on file    Number of children: Not on file    Years of education: Not on file    Highest education level: Not on file   Occupational History    Not on file   Tobacco Use    Smoking status: Never Smoker    Smokeless tobacco: Never Used   Vaping Use    Vaping Use: Never used   Substance and Sexual Activity    Alcohol use: No     Alcohol/week: 0.0 standard drinks    Drug use: Not on file    Sexual activity: Not on file   Other Topics Concern    Not on file   Social History Narrative    Not on file     Social Determinants of Health     Financial Resource Strain:     Difficulty of Paying Living Expenses:    Food Insecurity:     Worried About Running Out of Food in the Last Year:     [place] of Food in the Last Year:    Transportation Needs:     Lack of Transportation (Medical):      Lack of Transportation (Non-Medical):    Physical Activity:     Days of Exercise per Week:     Minutes of Exercise per Session:    Stress:     Feeling of Stress :    Social Connections:     Frequency of Communication with Friends and Family:     Frequency of Social Gatherings with Friends and Family:     Attends Jewish Services:     Active Member of Clubs or Organizations:     Attends Club or Organization Meetings:     Marital Status:    Intimate Partner Violence:     Fear of Current or Ex-Partner:     Emotionally Abused:     Physically Abused:     Sexually Abused:      Past Medical History:   Diagnosis Date    Abnormal renal function     Allergic contact dermatitis due to other agents 8/27/2020    Anemia     Depression     Dysmetabolic syndrome     Edema     Elevated glucose 2/7/2016    Health care maintenance 4/14/2016    Hypertension     Leukopenia     Osteoarthritis     Other constipation 12/19/2019    Other specified counseling 3/3/2016    Renal insufficiency 10/21/2015    Sleep apnea     with severe desaturations    Trigger finger of right thumb 2/3/2016    Has seen Ortho and got injection, doing better     Viral disease exposure 5/21/2020     Past Surgical History:   Procedure Laterality Date    BACK SURGERY  2005    NECK SURGERY       Family History   Problem Relation Age of Onset    High Blood Pressure Mother     Asthma Other    Plan  Patient was informed that he has got pretty severe degenerative joint disease of both knees and is likely looking at arthroplasty some point in future. He has however received good relief with the most recent steroid injections per Dr. Jaky Ugarte and as such we can just continue to observe this for now. I did inform him that that will be due injections that if he did decide knee replacements would have to wait 3 months to due to infectious concerns. Patient will return back here in 3 months or call if he has any problems prior to that time. Provider Attestation:  Angle Miller, personally performed the services described in this documentation. All medical record entries made by the scribe were at my direction and in my presence. I have reviewed the chart and discharge instructions and agree that the records reflect my personal performance and is accurate and complete. Sana Phillips MD. 07/30/21      Please note that this chart was generated using voice recognition Dragon dictation software. Although every effort was made to ensure the accuracy of this automated transcription, some errors in transcription may have occurred.